# Patient Record
Sex: FEMALE | Race: WHITE | Employment: PART TIME | URBAN - METROPOLITAN AREA
[De-identification: names, ages, dates, MRNs, and addresses within clinical notes are randomized per-mention and may not be internally consistent; named-entity substitution may affect disease eponyms.]

---

## 2017-01-11 ENCOUNTER — HOSPITAL ENCOUNTER (OUTPATIENT)
Dept: LAB | Age: 67
Discharge: HOME | End: 2017-01-11

## 2017-01-11 RX ORDER — ATENOLOL 50 MG/1
TABLET ORAL
Qty: 90 TAB | Refills: 1 | Status: SHIPPED | OUTPATIENT
Start: 2017-01-11 | End: 2017-07-12 | Stop reason: SDUPTHER

## 2017-01-11 RX ORDER — ATORVASTATIN CALCIUM 20 MG/1
TABLET, FILM COATED ORAL
Qty: 90 TAB | Refills: 1 | Status: SHIPPED | OUTPATIENT
Start: 2017-01-11 | End: 2017-05-10 | Stop reason: SDUPTHER

## 2017-02-22 RX ORDER — VENLAFAXINE HYDROCHLORIDE 37.5 MG/1
CAPSULE, EXTENDED RELEASE ORAL
Qty: 90 CAP | Refills: 0 | Status: SHIPPED | OUTPATIENT
Start: 2017-02-22 | End: 2017-04-13 | Stop reason: DRUGHIGH

## 2017-04-06 ENCOUNTER — HOSPITAL ENCOUNTER (OUTPATIENT)
Dept: MAMMOGRAPHY | Age: 67
Discharge: HOME | End: 2017-04-06
Attending: SPECIALIST
Payer: MEDICARE

## 2017-04-06 DIAGNOSIS — Z12.31 VISIT FOR SCREENING MAMMOGRAM: ICD-10-CM

## 2017-04-06 PROCEDURE — 77067 SCR MAMMO BI INCL CAD: CPT

## 2017-04-13 ENCOUNTER — OFFICE VISIT (OUTPATIENT)
Dept: INTERNAL MEDICINE CLINIC | Age: 67
End: 2017-04-13

## 2017-04-13 ENCOUNTER — TELEPHONE (OUTPATIENT)
Dept: INTERNAL MEDICINE CLINIC | Age: 67
End: 2017-04-13

## 2017-04-13 VITALS
SYSTOLIC BLOOD PRESSURE: 128 MMHG | DIASTOLIC BLOOD PRESSURE: 82 MMHG | TEMPERATURE: 98.1 F | HEART RATE: 60 BPM | RESPIRATION RATE: 18 BRPM | BODY MASS INDEX: 25.86 KG/M2 | WEIGHT: 140.5 LBS | OXYGEN SATURATION: 98 % | HEIGHT: 62 IN

## 2017-04-13 DIAGNOSIS — K64.9 BLEEDING HEMORRHOID: Primary | ICD-10-CM

## 2017-04-13 DIAGNOSIS — K59.04 CHRONIC IDIOPATHIC CONSTIPATION: ICD-10-CM

## 2017-04-13 RX ORDER — BUPROPION HYDROCHLORIDE 150 MG/1
TABLET ORAL
Refills: 0 | COMMUNITY
Start: 2017-03-02 | End: 2017-05-10 | Stop reason: SDUPTHER

## 2017-04-13 RX ORDER — CYCLOBENZAPRINE HCL 5 MG
TABLET ORAL
Refills: 0 | COMMUNITY
Start: 2017-03-16 | End: 2017-04-13 | Stop reason: ALTCHOICE

## 2017-04-13 RX ORDER — ESZOPICLONE 1 MG/1
TABLET, FILM COATED ORAL
Refills: 0 | COMMUNITY
Start: 2017-03-04 | End: 2017-04-13 | Stop reason: ALTCHOICE

## 2017-04-13 RX ORDER — ESTRADIOL 10 UG/1
INSERT VAGINAL AS NEEDED
Refills: 0 | COMMUNITY
Start: 2017-02-17 | End: 2018-06-07 | Stop reason: ALTCHOICE

## 2017-04-13 RX ORDER — VENLAFAXINE HYDROCHLORIDE 150 MG/1
CAPSULE, EXTENDED RELEASE ORAL
Refills: 0 | COMMUNITY
Start: 2017-03-02 | End: 2017-04-13 | Stop reason: DRUGHIGH

## 2017-04-13 RX ORDER — METHOCARBAMOL 500 MG/1
TABLET, FILM COATED ORAL
Refills: 0 | COMMUNITY
Start: 2017-03-21 | End: 2017-12-11 | Stop reason: ALTCHOICE

## 2017-04-13 RX ORDER — VENLAFAXINE HYDROCHLORIDE 75 MG/1
CAPSULE, EXTENDED RELEASE ORAL
Refills: 0 | COMMUNITY
Start: 2017-02-22 | End: 2017-05-10 | Stop reason: SDUPTHER

## 2017-04-13 RX ORDER — NAPROXEN 500 MG/1
TABLET ORAL
Refills: 0 | COMMUNITY
Start: 2017-03-16 | End: 2017-04-13 | Stop reason: ALTCHOICE

## 2017-04-13 NOTE — TELEPHONE ENCOUNTER
Call transferred as level 1. Pt complaining of rectal bleeding since approximately 6a. She thought is was an external hemorrhoid but does not seem that is blood is coming from and it continues to bleed since then. Pt advised that blood is bright red and denies any other symptoms. Advised of appt for evaluation. Appt provided.

## 2017-04-13 NOTE — PROGRESS NOTES
Chief Complaint   Patient presents with    Rectal Bleeding     Rectal bleeding  Pt reports she felt pea sized polpyp last few days which got firm but then resolved and became fleshy. She had colonscopy with Dr. Kalyn Torres and no polyps or adenoma. She had colonscopy . No abdominal pain but occasional heartburn and takes pepcid ac  She reports there is about 2 Tablespoons of blood which comes out. She reports there is no gushing blood or increase of blood in commode. No abdominal pain, no weight loss, energy level is good. Past Medical History:   Diagnosis Date    Anxiety     Basal cell carcinoma, eyelid     right, Dr. Rosario Hernandez, Dr. Kam Aldridge Environmental allergies     GERD (gastroesophageal reflux disease)     HTN (hypertension) 2011    Hyperlipidemia     Insomnia     Menopause 2009    MVP (mitral valve prolapse) 2009    rare palpitations.  mild-mod regurg 2011    Normal stress echocardiogram 10/2009    Recurrent cold sores      Past Surgical History:   Procedure Laterality Date    BREAST SURGERY PROCEDURE UNLISTED      breast reduction  Dr Christiano Crane HX BREAST REDUCTION Bilateral     HX GYN           Social History     Social History    Marital status:      Spouse name: N/A    Number of children: N/A    Years of education: N/A     Social History Main Topics    Smoking status: Former Smoker     Quit date: 1990    Smokeless tobacco: Never Used      Comment: quit     Alcohol use 1.8 - 2.4 oz/week     3 - 4 Glasses of wine per week    Drug use: No    Sexual activity: Yes     Partners: Male     Other Topics Concern    None     Social History Narrative     Family History   Problem Relation Age of Onset    Cancer Father      lung    Hypertension Brother      Current Outpatient Prescriptions   Medication Sig Dispense Refill    buPROPion XL (WELLBUTRIN XL) 150 mg tablet take 1 tablet by mouth every morning  0    venlafaxine-SR (EFFEXOR-XR) 75 mg capsule take 1 capsule by mouth every morning  0    VAGIFEM 10 mcg tab vaginal tablet as needed. 0    atorvastatin (LIPITOR) 20 mg tablet take 1 tablet by mouth once daily 90 Tab 1    atenolol (TENORMIN) 50 mg tablet take 1 tablet by mouth once daily 90 Tab 1    topiramate (TOPAMAX) 50 mg tablet take 1 tablet by mouth daily 30 Tab 5    traZODone (DESYREL) 50 mg tablet take 1 tablet once daily 30 Tab 3    metFORMIN ER (GLUCOPHAGE XR) 500 mg tablet take 1 tablet by mouth daily with DINNER 901 Tab 2    venlafaxine-SR (EFFEXOR-XR) 37.5 mg capsule take 1 capsule by mouth once daily 90 Cap 0    ALPRAZolam (XANAX) 1 mg tablet Take 1 mg by mouth as needed. 0    VOLTAREN 1 % topical gel   0    lisinopril (PRINIVIL, ZESTRIL) 40 mg tablet Take 40 mg by mouth daily.  calcium 600 mg Cap take  by mouth daily.  methocarbamol (ROBAXIN) 500 mg tablet   0     Allergies   Allergen Reactions    Codeine Other (comments)     \"unusual sensation\"       Review of Systems - General ROS: negative for - chills, fatigue, fever, hot flashes, malaise, night sweats or sleep disturbance  Cardiovascular ROS: no chest pain or dyspnea on exertion  Respiratory ROS: no cough, shortness of breath, or wheezing    Visit Vitals    /82 (BP 1 Location: Left arm, BP Patient Position: Sitting)    Pulse 60    Temp 98.1 °F (36.7 °C) (Oral)    Resp 18    Ht 5' 2\" (1.575 m)    Wt 140 lb 8 oz (63.7 kg)    SpO2 98%    BMI 25.7 kg/m2     General Appearance:  Well developed, well nourished,alert and oriented x 3, and individual in no acute distress. Ears/Nose/Mouth/Throat:   Hearing grossly normal.         Neck: Supple, no lad, no bruits   Chest:   Lungs clear to auscultation bilaterally. Cardiovascular:  Regular rate and rhythm, S1, S2 normal, no murmur. Abdomen:   Soft, non-tender, bowel sounds are active. Extremities: No edema bilaterally.     Skin: Warm and dry, no suspicious lesions  Rectal: gross heme positive stool,  1 external polyp, +internal polyps                 Montserrat Andres was seen today for rectal bleeding. Diagnoses and all orders for this visit:      Hemorrhoids  Chronic idiopathic constipation  Pt has rectal bleeding likely secondary to internal hemorroids. Discussed with pt her colonscopy was about 4 years ago and no suspicious lesions. It did not note diverticuli. Discussed constipation as likely cause for her sx. She will do clean out with mom or miralax twice a day for 1 week then switch to daily. Goal of stool discussed as soft/peanut butter consistency. She agrees to try clean out and maintenace as notice chronic hx of constipation. Discussed if her bleeding does not improve in the next 1-2 months she may need colonscopy. She agrees  I spent 25 min with this patient and >50% of the time was spent on counseling and management of hemorroids and constipation. This note will not be viewable in [address]

## 2017-04-13 NOTE — MR AVS SNAPSHOT
Visit Information Date & Time Provider Department Dept. Phone Encounter #  
 4/13/2017  1:00 PM Kathleen Calderon MD [facility] [address]  [id number] Upcoming Health Maintenance Date Due Hepatitis C Screening 1950 DTaP/Tdap/Td series (1 - Tdap) 7/26/2007 ZOSTER VACCINE AGE 60> 7/18/2010 GLAUCOMA SCREENING Q2Y 7/18/2015 OSTEOPOROSIS SCREENING (DEXA) 7/18/2015 Pneumococcal 65+ Low/Medium Risk (1 of 2 - PCV13) 7/18/2015 MEDICARE YEARLY EXAM 7/18/2015 INFLUENZA AGE 9 TO ADULT 8/1/2016 BREAST CANCER SCRN MAMMOGRAM 4/6/2019 COLONOSCOPY 2/18/2024 Allergies as of 4/13/2017  Review Complete On: 4/13/2017 By: Kathleen Calderon MD  
  
 Severity Noted Reaction Type Reactions Codeine  04/21/2009    Other (comments) \"unusual sensation\" Current Immunizations  Reviewed on 6/4/2012 Name Date Rabies Vaccine 6/4/2012  2:38 PM, 5/28/2012 11:51 AM, 5/24/2012  4:24 PM, 5/21/2012  5:41 PM  
 TD Vaccine 7/25/2007 Not reviewed this visit You Were Diagnosed With   
  
 Codes Comments Chronic idiopathic constipation    -  Primary ICD-10-CM: K59.04 
ICD-9-CM: 564.00 Vitals BP Pulse Temp Resp Height(growth percentile) Weight(growth percentile) 128/82 (BP 1 Location: Left arm, BP Patient Position: Sitting) 60 98.1 °F (36.7 °C) (Oral) 18 5' 2\" (1.575 m) 140 lb 8 oz (63.7 kg) SpO2 BMI OB Status Smoking Status 98% 25.7 kg/m2 Postmenopausal Former Smoker Vitals History BMI and BSA Data Body Mass Index Body Surface Area 25.7 kg/m 2 1.67 m 2 Preferred Pharmacy Pharmacy Name Phone [facility] [address] [place] [address] [address] Your Updated Medication List  
  
   
This list is accurate as of: 4/13/17  1:47 PM.  Always use your most recent med list.  
  
  
  
  
 ALPRAZolam 1 mg tablet Commonly known as:  June Jessica Take 1 mg by mouth as needed. atenolol 50 mg tablet Commonly known as:  TENORMIN  
take 1 tablet by mouth once daily  
  
 atorvastatin 20 mg tablet Commonly known as:  LIPITOR  
take 1 tablet by mouth once daily buPROPion  mg tablet Commonly known as:  WELLBUTRIN XL  
take 1 tablet by mouth every morning  
  
 calcium 600 mg Cap  
take  by mouth daily. lisinopril 40 mg tablet Commonly known as:  Ion Leaver Take 40 mg by mouth daily. metFORMIN  mg tablet Commonly known as:  GLUCOPHAGE XR  
take 1 tablet by mouth daily with DINNER  
  
 methocarbamol 500 mg tablet Commonly known as:  ROBAXIN  
  
 topiramate 50 mg tablet Commonly known as:  TOPAMAX  
take 1 tablet by mouth daily  
  
 traZODone 50 mg tablet Commonly known as:  DESYREL  
take 1 tablet once daily VAGIFEM 10 mcg Tab vaginal tablet Generic drug:  estradiol  
as needed. * venlafaxine-SR 37.5 mg capsule Commonly known as:  EFFEXOR-XR  
take 1 capsule by mouth once daily * venlafaxine-SR 75 mg capsule Commonly known as:  EFFEXOR-XR  
take 1 capsule by mouth every morning VOLTAREN 1 % Gel Generic drug:  diclofenac * Notice: This list has 2 medication(s) that are the same as other medications prescribed for you. Read the directions carefully, and ask your doctor or other care provider to review them with you. Patient Instructions Hemorrhoids: Care Instructions Your Care Instructions Hemorrhoids are enlarged veins that develop in the anal canal. Bleeding during bowel movements, itching, swelling, and rectal pain are the most common symptoms. They can be uncomfortable at times, but hemorrhoids rarely are a serious problem. You can treat most hemorrhoids with simple changes to your diet and bowel habits. These changes include eating more fiber and not straining to pass stools.  Most hemorrhoids do not need surgery or other treatment unless they are very large and painful or bleed a lot. Follow-up care is a key part of your treatment and safety. Be sure to make and go to all appointments, and call your doctor if you are having problems. Its also a good idea to know your test results and keep a list of the medicines you take. How can you care for yourself at home? · Sit in a few inches of warm water (sitz bath) 3 times a day and after bowel movements. The warm water helps with pain and itching. · Put ice on your anal area several times a day for 10 minutes at a time. Put a thin cloth between the ice and your skin. Follow this by placing a warm, wet towel on the area for another 10 to 20 minutes. · Take pain medicines exactly as directed. ¨ If the doctor gave you a prescription medicine for pain, take it as prescribed. ¨ If you are not taking a prescription pain medicine, ask your doctor if you can take an over-the-counter medicine. · Keep the anal area clean, but be gentle. Use water and a fragrance-free soap, such as [place], or use baby wipes or medicated pads, such as Tucks. · Wear cotton underwear and loose clothing to decrease moisture in the anal area. · Eat more fiber. Include foods such as whole-grain breads and cereals, raw vegetables, raw and dried fruits, and beans. · Drink plenty of fluids, enough so that your urine is light yellow or clear like water. If you have kidney, heart, or liver disease and have to limit fluids, talk with your doctor before you increase the amount of fluids you drink. · Use a stool softener that contains bran or psyllium. You can save money by buying bran or psyllium (available in bulk at most health food stores) and sprinkling it on foods or stirring it into fruit juice. Or you can use a product such as Metamucil or Hydrocil. · Practice healthy bowel habits. ¨ Go to the bathroom as soon as you have the urge. ¨ Avoid straining to pass stools.  Relax and give yourself time to let things happen naturally. ¨ Do not hold your breath while passing stools. ¨ Do not read while sitting on the toilet. Get off the toilet as soon as you have finished. · Take your medicines exactly as prescribed. Call your doctor if you think you are having a problem with your medicine. When should you call for help? Call 911 anytime you think you may need emergency care. For example, call if: 
· You pass maroon or very bloody stools. Call your doctor now or seek immediate medical care if: 
· You have increased pain. · You have increased bleeding. Watch closely for changes in your health, and be sure to contact your doctor if: 
· Your symptoms have not improved after 3 or 4 days. Where can you learn more? Go to http://peri-akhil.info/. Enter F228 in the search box to learn more about \"Hemorrhoids: Care Instructions. \" Current as of: August 9, 2016 Content Version: 11.2 © 3906-4732 [employer]. Care instructions adapted under license by [employer] (which disclaims liability or warranty for this information). If you have questions about a medical condition or this instruction, always ask your healthcare professional. [address] any warranty or liability for your use of this information. Introducing [facility]! Dear Libby Peñaloza: Thank you for requesting a [employer] account. Our records indicate that you already have an active [employer] account. You can access your account anytime at [email or web address] Did you know that you can access your hospital and ER discharge instructions at any time in [employer]? You can also review all of your test results from your hospital stay or ER visit. Additional Information If you have questions, please visit the Frequently Asked Questions section of the [employer] website at [email or web address] Remember, MyChart is NOT to be used for urgent needs. For medical emergencies, dial 911. Now available from your iPhone and Android! Please provide this summary of care documentation to your next provider. Your primary care clinician is listed as Fernanda Prasad. If you have any questions after today's visit, please call [phone].

## 2017-04-13 NOTE — PATIENT INSTRUCTIONS

## 2017-04-25 ENCOUNTER — TELEPHONE (OUTPATIENT)
Dept: INTERNAL MEDICINE CLINIC | Age: 67
End: 2017-04-25

## 2017-04-25 NOTE — TELEPHONE ENCOUNTER
V/m left for patient notifying per PCP she is over due for her 6 month f/u & med evaluation. I expressed she did see Roberta Teixeira recently for an acute issue but she is still overdue for a med evaluation with PCP. Encouraged patient to return the office call to schedule an appt.

## 2017-04-25 NOTE — TELEPHONE ENCOUNTER
Patient request a return call regarding concerns with why her medication was denied.  Patient contact [phone] (N)

## 2017-04-27 NOTE — TELEPHONE ENCOUNTER
Patient refill. Patient has scheduled an appt for 5/10/17. She would like enough medication to get her through until her appt.

## 2017-04-28 RX ORDER — LISINOPRIL 40 MG/1
40 TABLET ORAL DAILY
Qty: 20 TAB | Refills: 0 | Status: SHIPPED | OUTPATIENT
Start: 2017-04-28 | End: 2017-05-10 | Stop reason: SDUPTHER

## 2017-05-10 ENCOUNTER — OFFICE VISIT (OUTPATIENT)
Dept: INTERNAL MEDICINE CLINIC | Age: 67
End: 2017-05-10

## 2017-05-10 VITALS
TEMPERATURE: 97.7 F | HEART RATE: 87 BPM | SYSTOLIC BLOOD PRESSURE: 115 MMHG | WEIGHT: 138.2 LBS | DIASTOLIC BLOOD PRESSURE: 77 MMHG | RESPIRATION RATE: 17 BRPM | BODY MASS INDEX: 25.43 KG/M2 | HEIGHT: 62 IN | OXYGEN SATURATION: 98 %

## 2017-05-10 DIAGNOSIS — F41.9 ANXIETY: ICD-10-CM

## 2017-05-10 DIAGNOSIS — E78.5 DYSLIPIDEMIA, GOAL LDL BELOW 100: ICD-10-CM

## 2017-05-10 DIAGNOSIS — I10 ESSENTIAL HYPERTENSION: Primary | ICD-10-CM

## 2017-05-10 DIAGNOSIS — R73.02 GLUCOSE INTOLERANCE (IMPAIRED GLUCOSE TOLERANCE): ICD-10-CM

## 2017-05-10 DIAGNOSIS — Z11.59 SCREENING FOR VIRAL DISEASE: ICD-10-CM

## 2017-05-10 PROBLEM — F98.8 ADD (ATTENTION DEFICIT DISORDER): Status: ACTIVE | Noted: 2017-05-10

## 2017-05-10 RX ORDER — VENLAFAXINE HYDROCHLORIDE 75 MG/1
CAPSULE, EXTENDED RELEASE ORAL
Qty: 30 CAP | Refills: 5 | Status: SHIPPED | OUTPATIENT
Start: 2017-05-10 | End: 2017-11-20 | Stop reason: SDUPTHER

## 2017-05-10 RX ORDER — ATORVASTATIN CALCIUM 20 MG/1
TABLET, FILM COATED ORAL
Qty: 90 TAB | Refills: 1 | Status: SHIPPED | OUTPATIENT
Start: 2017-05-10 | End: 2017-12-11 | Stop reason: SDUPTHER

## 2017-05-10 RX ORDER — BUPROPION HYDROCHLORIDE 150 MG/1
TABLET ORAL
Qty: 30 TAB | Refills: 5 | Status: SHIPPED | OUTPATIENT
Start: 2017-05-10 | End: 2017-11-14 | Stop reason: SDUPTHER

## 2017-05-10 RX ORDER — LISINOPRIL 40 MG/1
40 TABLET ORAL DAILY
Qty: 90 TAB | Refills: 2 | Status: SHIPPED | OUTPATIENT
Start: 2017-05-10 | End: 2017-12-11 | Stop reason: SDUPTHER

## 2017-05-10 RX ORDER — DEXTROAMPHETAMINE SACCHARATE, AMPHETAMINE ASPARTATE, DEXTROAMPHETAMINE SULFATE AND AMPHETAMINE SULFATE 2.5; 2.5; 2.5; 2.5 MG/1; MG/1; MG/1; MG/1
10 TABLET ORAL
COMMUNITY
End: 2018-06-07 | Stop reason: ALTCHOICE

## 2017-05-10 NOTE — PROGRESS NOTES
HISTORY OF PRESENT ILLNESS  Irving Alvarado is a 77 y.o. female. HPI  Med check. Issues:  1. Hypertension, on Lisinopril. No significant cough, chest pain or shortness of breath. 2. Dyslipidemia, on statin. She wants to discuss pros and cons. We discussed benefits in terms of decreasing stroke and heart attack and risks in terms of myopathy. Will check a CK level today. 3. Previous bleeding with constipation. This is all resolved. We discussed using Miralax daily to help prevent. 4. ADD. Did see a nurse practitioner named Pily Hatfield, who changed her meds to Effexor 75, Wellbutrin 150 and gave her Adderall 10 mg for prn use. She feels this combination has been very helpful. I'm happy to take over on Wellbutrin and Effexor, but asked her to continue with her mental health practitioner for the Adderall. She's in agreement. Review of Systems   Constitutional: Negative for chills, fever and weight loss. Respiratory: Negative for cough, shortness of breath and wheezing. Cardiovascular: Negative for chest pain, palpitations, orthopnea, leg swelling and PND. Gastrointestinal: Positive for constipation. Negative for abdominal pain, blood in stool, heartburn and nausea. Musculoskeletal: Negative for myalgias. Neurological: Negative for dizziness and headaches. Psychiatric/Behavioral: Negative for depression. The patient is not nervous/anxious and does not have insomnia. Physical Exam   Constitutional: She is oriented to person, place, and time. She appears well-developed and well-nourished. HENT:   Head: Normocephalic and atraumatic. Neck: Normal range of motion. Neck supple. Carotid bruit is not present. No thyromegaly present. Cardiovascular: Normal rate, regular rhythm, S1 normal, S2 normal, normal heart sounds and intact distal pulses. No murmur heard. Pulmonary/Chest: Effort normal and breath sounds normal. No respiratory distress. She has no wheezes. She has no rales. Musculoskeletal: She exhibits no edema. Neurological: She is alert and oriented to person, place, and time. Psychiatric: She has a normal mood and affect. Her behavior is normal.   Nursing note and vitals reviewed. ASSESSMENT and PLAN  Eda Noonan was seen today for medication evaluation. Diagnoses and all orders for this visit:    Essential hypertension  -     lisinopril (PRINIVIL, ZESTRIL) 40 mg tablet; Take 1 Tab by mouth daily.     Dyslipidemia, goal LDL below 100  -     atorvastatin (LIPITOR) 20 mg tablet; take 1 tablet by mouth once daily  -     METABOLIC PANEL, COMPREHENSIVE  -     LIPID PANEL  -     TSH 3RD GENERATION  -     CK    Anxiety-cont meds stable overall see mental health provider for the add meds  -     buPROPion XL (WELLBUTRIN XL) 150 mg tablet; take 1 tablet by mouth every morning  -     venlafaxine-SR (EFFEXOR-XR) 75 mg capsule; take 1 capsule by mouth every morning    Screening for viral disease  -     HEPATITIS C AB    Glucose intolerance (impaired glucose tolerance)  -     HEMOGLOBIN A1C WITH EAG      lab results and schedule of future lab studies reviewed with patient  reviewed diet, exercise and weight control

## 2017-05-10 NOTE — MR AVS SNAPSHOT
Visit Information Date & Time Provider Department Dept. Phone Encounter #  
 5/10/2017  3:00 PM Cem Lam MD [facility] [address] [id number] Upcoming Health Maintenance Date Due Hepatitis C Screening 1950 DTaP/Tdap/Td series (1 - Tdap) 7/26/2007 ZOSTER VACCINE AGE 60> 7/18/2010 GLAUCOMA SCREENING Q2Y 7/18/2015 OSTEOPOROSIS SCREENING (DEXA) 7/18/2015 Pneumococcal 65+ Low/Medium Risk (1 of 2 - PCV13) 7/18/2015 MEDICARE YEARLY EXAM 7/18/2015 INFLUENZA AGE 9 TO ADULT 8/1/2017 BREAST CANCER SCRN MAMMOGRAM 4/6/2019 COLONOSCOPY 2/18/2024 Allergies as of 5/10/2017  Review Complete On: 5/10/2017 By: Cem Lam MD  
  
 Severity Noted Reaction Type Reactions Codeine  04/21/2009    Other (comments) \"unusual sensation\" Current Immunizations  Reviewed on 6/4/2012 Name Date Rabies Vaccine 6/4/2012  2:38 PM, 5/28/2012 11:51 AM, 5/24/2012  4:24 PM, 5/21/2012  5:41 PM  
 TD Vaccine 7/25/2007 Not reviewed this visit You Were Diagnosed With   
  
 Codes Comments Essential hypertension    -  Primary ICD-10-CM: I10 
ICD-9-CM: 401.9 Dyslipidemia, goal LDL below 100     ICD-10-CM: E78.5 ICD-9-CM: 272.4 Anxiety     ICD-10-CM: F41.9 ICD-9-CM: 300.00 Screening for viral disease     ICD-10-CM: Z11.59 
ICD-9-CM: V73.99 Glucose intolerance (impaired glucose tolerance)     ICD-10-CM: R73.02 
ICD-9-CM: 790.22 Vitals BP Pulse Temp Resp Height(growth percentile) Weight(growth percentile) 115/77 (BP 1 Location: Left arm, BP Patient Position: Sitting) 87 97.7 °F (36.5 °C) (Oral) 17 5' 2\" (1.575 m) 138 lb 3.2 oz (62.7 kg) SpO2 BMI OB Status Smoking Status 98% 25.28 kg/m2 Postmenopausal Former Smoker Vitals History BMI and BSA Data Body Mass Index Body Surface Area  
 25.28 kg/m 2 1.66 m 2 Preferred Pharmacy Pharmacy Name Phone [facility], [address] [facility] Your Updated Medication List  
  
   
This list is accurate as of: 5/10/17  3:29 PM.  Always use your most recent med list.  
  
  
  
  
 ADDERALL 10 mg tablet Generic drug:  dextroamphetamine-amphetamine Take 10 mg by mouth. ALPRAZolam 1 mg tablet Commonly known as:  Derek Cellar Take 1 mg by mouth as needed. atenolol 50 mg tablet Commonly known as:  TENORMIN  
take 1 tablet by mouth once daily  
  
 atorvastatin 20 mg tablet Commonly known as:  LIPITOR  
take 1 tablet by mouth once daily buPROPion  mg tablet Commonly known as:  WELLBUTRIN XL  
take 1 tablet by mouth every morning  
  
 calcium 600 mg Cap  
take  by mouth daily. lisinopril 40 mg tablet Commonly known as:  Minus Salk Take 1 Tab by mouth daily. metFORMIN  mg tablet Commonly known as:  GLUCOPHAGE XR  
take 1 tablet by mouth daily with DINNER  
  
 methocarbamol 500 mg tablet Commonly known as:  ROBAXIN  
  
 topiramate 50 mg tablet Commonly known as:  TOPAMAX  
take 1 tablet by mouth daily  
  
 traZODone 50 mg tablet Commonly known as:  DESYREL  
take 1 tablet once daily VAGIFEM 10 mcg Tab vaginal tablet Generic drug:  estradiol  
as needed. venlafaxine-SR 75 mg capsule Commonly known as:  EFFEXOR-XR  
take 1 capsule by mouth every morning VOLTAREN 1 % Gel Generic drug:  diclofenac Prescriptions Sent to Pharmacy Refills buPROPion XL (WELLBUTRIN XL) 150 mg tablet 5 Sig: take 1 tablet by mouth every morning Class: Normal  
 [facility]acy: [facility] [address] [place] [address], [address] [place] [address] Ph #: [phone]  
 venlafaxine-SR (EFFEXOR-XR) 75 mg capsule 5 Sig: take 1 capsule by mouth every morning Class: Normal  
 Pharmacy: [address] [place] [address], [address] [place] [address] Ph #: [phone] lisinopril (PRINIVIL, ZESTRIL) 40 mg tablet 2 Sig: Take 1 Tab by mouth daily. Class: Normal  
 Pharmacy: [address] [place] [address], [address] [place] [address] ROAD Ph #: [phone] Route: Oral  
 atorvastatin (LIPITOR) 20 mg tablet 1 Sig: take 1 tablet by mouth once daily Class: Normal  
 Pharmacy: [address] [place] [address], [address] [place] [address] RO[address] Ph #: [phone] We Performed the Following CK [id number] CPT(R)] HEMOGLOBIN A1C WITH EAG [70670 CPT(R)] HEPATITIS C AB [05772 CPT(R)] LIPID PANEL [50242 CPT(R)] METABOLIC PANEL, COMPREHENSIVE [19141 CPT(R)] TSH 3RD GENERATION [91956 CPT(R)] Introducing \[facility]\"" & [facility]! Dear Terrell Olguin: Thank you for requesting a [employer] account. Our records indicate that you already have an active [employer] account. You can access your account anytime at [email or web address] Did you know that you can access your hospital and ER discharge instructions at any time in [employer]? You can also review all of your test results from your hospital stay or ER visit. Additional Information If you have questions, please visit the Frequently Asked Questions section of the [employer] website at [email or web address] [email or web address]/. Remember, [employer] is NOT to be used for urgent needs. For medical emergencies, dial 911. Now available from your iPhone and Android! Please provide this summary of care documentation to your next provider. Your primary care clinician is listed as Fernanda 68. If you have any questions after today's visit, please call [phone].

## 2017-06-07 ENCOUNTER — TELEPHONE (OUTPATIENT)
Dept: INTERNAL MEDICINE CLINIC | Age: 67
End: 2017-06-07

## 2017-06-07 RX ORDER — TOPIRAMATE 50 MG/1
TABLET, FILM COATED ORAL
Qty: 30 TAB | Refills: 5 | Status: SHIPPED | OUTPATIENT
Start: 2017-06-07 | End: 2018-01-22 | Stop reason: SDUPTHER

## 2017-06-07 NOTE — TELEPHONE ENCOUNTER
----- Message from Mikal Tsang sent at 6/7/2017 12:08 PM EDT -----  Regarding: [place]/refill  Pt [facility] on [facility] called [facility] 2 days ago. She is going out of town shortly. Best contact number [phone].

## 2017-06-17 DIAGNOSIS — G47.00 INSOMNIA: ICD-10-CM

## 2017-06-18 RX ORDER — TRAZODONE HYDROCHLORIDE 50 MG/1
TABLET ORAL
Qty: 30 TAB | Refills: 3 | Status: SHIPPED | OUTPATIENT
Start: 2017-06-18 | End: 2017-11-19 | Stop reason: SDUPTHER

## 2017-06-26 RX ORDER — BUPROPION HYDROCHLORIDE 300 MG/1
TABLET ORAL
Qty: 90 TAB | Refills: 1 | Status: SHIPPED | OUTPATIENT
Start: 2017-06-26 | End: 2017-12-11 | Stop reason: ALTCHOICE

## 2017-06-27 ENCOUNTER — HOSPITAL ENCOUNTER (OUTPATIENT)
Dept: LAB | Age: 67
Discharge: HOME | End: 2017-06-27
Payer: MEDICARE

## 2017-06-27 PROCEDURE — 36415 COLL VENOUS BLD VENIPUNCTURE: CPT

## 2017-06-27 PROCEDURE — 83036 HEMOGLOBIN GLYCOSYLATED A1C: CPT

## 2017-06-27 PROCEDURE — 82550 ASSAY OF CK (CPK): CPT

## 2017-06-27 PROCEDURE — 80053 COMPREHEN METABOLIC PANEL: CPT

## 2017-06-27 PROCEDURE — 80061 LIPID PANEL: CPT

## 2017-06-27 PROCEDURE — 86803 HEPATITIS C AB TEST: CPT

## 2017-06-27 PROCEDURE — 84443 ASSAY THYROID STIM HORMONE: CPT

## 2017-06-28 LAB
ALBUMIN SERPL-MCNC: 4.3 G/DL (ref 3.6–4.8)
ALBUMIN/GLOB SERPL: 1.9 {RATIO} (ref 1.2–2.2)
ALP SERPL-CCNC: 89 IU/L (ref 39–117)
ALT SERPL-CCNC: 19 IU/L (ref 0–32)
AST SERPL-CCNC: 18 IU/L (ref 0–40)
BILIRUB SERPL-MCNC: 0.4 MG/DL (ref 0–1.2)
BUN SERPL-MCNC: 14 MG/DL (ref 8–27)
BUN/CREAT SERPL: 15 (ref 12–28)
CALCIUM SERPL-MCNC: 9.9 MG/DL (ref 8.7–10.3)
CHLORIDE SERPL-SCNC: 105 MMOL/L (ref 96–106)
CHOLEST SERPL-MCNC: 146 MG/DL (ref 100–199)
CK SERPL-CCNC: 53 U/L (ref 24–173)
CO2 SERPL-SCNC: 22 MMOL/L (ref 18–29)
CREAT SERPL-MCNC: 0.93 MG/DL (ref 0.57–1)
EST. AVERAGE GLUCOSE BLD GHB EST-MCNC: 111 MG/DL
GLOBULIN SER CALC-MCNC: 2.3 G/DL (ref 1.5–4.5)
GLUCOSE SERPL-MCNC: 114 MG/DL (ref 65–99)
HBA1C MFR BLD: 5.5 % (ref 4.8–5.6)
HCV AB S/CO SERPL IA: <0.1 S/CO RATIO (ref 0–0.9)
HDLC SERPL-MCNC: 53 MG/DL
INTERPRETATION, 910389: NORMAL
LDLC SERPL CALC-MCNC: 45 MG/DL (ref 0–99)
POTASSIUM SERPL-SCNC: 4.7 MMOL/L (ref 3.5–5.2)
PROT SERPL-MCNC: 6.6 G/DL (ref 6–8.5)
SODIUM SERPL-SCNC: 144 MMOL/L (ref 134–144)
TRIGL SERPL-MCNC: 242 MG/DL (ref 0–149)
TSH SERPL DL<=0.005 MIU/L-ACNC: 3 UIU/ML (ref 0.45–4.5)
VLDLC SERPL CALC-MCNC: 48 MG/DL (ref 5–40)

## 2017-07-12 RX ORDER — ATENOLOL 50 MG/1
TABLET ORAL
Qty: 90 TAB | Refills: 3 | Status: SHIPPED | OUTPATIENT
Start: 2017-07-12 | End: 2018-07-19 | Stop reason: SDUPTHER

## 2017-08-18 ENCOUNTER — PATIENT MESSAGE (OUTPATIENT)
Dept: INTERNAL MEDICINE CLINIC | Age: 67
End: 2017-08-18

## 2017-08-22 ENCOUNTER — PATIENT MESSAGE (OUTPATIENT)
Dept: INTERNAL MEDICINE CLINIC | Age: 67
End: 2017-08-22

## 2017-08-23 NOTE — TELEPHONE ENCOUNTER
From: Kaylee Trujillo  To: Ponce Delacruz MD  Sent: 8/18/2017 1:37 PM EDT  Subject: Referral Request      Hi Dr Geena Go would like to see a Rheumatologist and Mary Jones in [place] was suggested by a friend. His office requires records from my primary Doc. Their # is [phone] and fax is [phone], Thanks!   Kenyatta Yanez  [phone]

## 2017-08-23 NOTE — TELEPHONE ENCOUNTER
From: Eli Trujillo  To: Zach Walsh MD  Sent: 8/22/2017 5:56 PM EDT  Subject: Referral Request      Just checking to make sure you had received the referral request I sent on Aug. 18th. ..

## 2017-10-09 DIAGNOSIS — R73.02 GLUCOSE INTOLERANCE (IMPAIRED GLUCOSE TOLERANCE): ICD-10-CM

## 2017-10-09 RX ORDER — METFORMIN HYDROCHLORIDE 500 MG/1
TABLET, EXTENDED RELEASE ORAL
Qty: 901 TAB | Refills: 2 | Status: SHIPPED | OUTPATIENT
Start: 2017-10-09

## 2017-11-14 DIAGNOSIS — F41.9 ANXIETY: ICD-10-CM

## 2017-11-14 RX ORDER — BUPROPION HYDROCHLORIDE 150 MG/1
TABLET ORAL
Qty: 30 TAB | Refills: 0 | Status: SHIPPED | OUTPATIENT
Start: 2017-11-14 | End: 2017-12-13 | Stop reason: SDUPTHER

## 2017-11-19 DIAGNOSIS — G47.00 INSOMNIA: ICD-10-CM

## 2017-11-19 RX ORDER — TRAZODONE HYDROCHLORIDE 50 MG/1
TABLET ORAL
Qty: 30 TAB | Refills: 0 | Status: SHIPPED | OUTPATIENT
Start: 2017-11-19 | End: 2017-12-21 | Stop reason: SDUPTHER

## 2017-11-20 DIAGNOSIS — F41.9 ANXIETY: ICD-10-CM

## 2017-11-20 RX ORDER — VENLAFAXINE HYDROCHLORIDE 75 MG/1
CAPSULE, EXTENDED RELEASE ORAL
Qty: 30 CAP | Refills: 0 | Status: SHIPPED | OUTPATIENT
Start: 2017-11-20 | End: 2018-01-03 | Stop reason: SDUPTHER

## 2017-12-11 ENCOUNTER — OFFICE VISIT (OUTPATIENT)
Dept: INTERNAL MEDICINE CLINIC | Age: 67
End: 2017-12-11

## 2017-12-11 VITALS
BODY MASS INDEX: 25.76 KG/M2 | WEIGHT: 140 LBS | HEART RATE: 62 BPM | RESPIRATION RATE: 16 BRPM | DIASTOLIC BLOOD PRESSURE: 79 MMHG | SYSTOLIC BLOOD PRESSURE: 126 MMHG | HEIGHT: 62 IN | TEMPERATURE: 97.6 F | OXYGEN SATURATION: 98 %

## 2017-12-11 DIAGNOSIS — I10 ESSENTIAL HYPERTENSION: Primary | ICD-10-CM

## 2017-12-11 DIAGNOSIS — Z12.11 COLON CANCER SCREENING: ICD-10-CM

## 2017-12-11 DIAGNOSIS — F41.9 ANXIETY: ICD-10-CM

## 2017-12-11 DIAGNOSIS — K06.9 DISEASE OF GINGIVA DUE TO RECURRENT ORAL HERPES SIMPLEX VIRUS (HSV) INFECTION: ICD-10-CM

## 2017-12-11 DIAGNOSIS — E78.5 DYSLIPIDEMIA, GOAL LDL BELOW 100: ICD-10-CM

## 2017-12-11 DIAGNOSIS — B00.9 DISEASE OF GINGIVA DUE TO RECURRENT ORAL HERPES SIMPLEX VIRUS (HSV) INFECTION: ICD-10-CM

## 2017-12-11 DIAGNOSIS — Z78.0 POSTMENOPAUSE: ICD-10-CM

## 2017-12-11 DIAGNOSIS — E78.00 PURE HYPERCHOLESTEROLEMIA: ICD-10-CM

## 2017-12-11 RX ORDER — DICLOFENAC POTASSIUM 50 MG/1
1 TABLET, FILM COATED ORAL
Refills: 3 | COMMUNITY
Start: 2017-10-03 | End: 2018-06-07 | Stop reason: ALTCHOICE

## 2017-12-11 RX ORDER — ACYCLOVIR 50 MG/G
OINTMENT TOPICAL
Qty: 2 G | Refills: 0 | Status: SHIPPED | OUTPATIENT
Start: 2017-12-11

## 2017-12-11 RX ORDER — LISINOPRIL 40 MG/1
40 TABLET ORAL DAILY
Qty: 90 TAB | Refills: 2 | Status: SHIPPED | OUTPATIENT
Start: 2017-12-11 | End: 2018-04-17 | Stop reason: DRUGHIGH

## 2017-12-11 RX ORDER — ATORVASTATIN CALCIUM 20 MG/1
TABLET, FILM COATED ORAL
Qty: 90 TAB | Refills: 1 | Status: SHIPPED | OUTPATIENT
Start: 2017-12-11

## 2017-12-11 RX ORDER — ZOLPIDEM TARTRATE 10 MG/1
1 TABLET ORAL
Refills: 0 | COMMUNITY
Start: 2017-11-15

## 2017-12-11 NOTE — MR AVS SNAPSHOT
Visit Information Date & Time Provider Department Dept. Phone Encounter #  
 12/11/2017  2:15 PM Rafy Oquendo MD [facility] [address] [id number] Upcoming Health Maintenance Date Due ZOSTER VACCINE AGE 60> 5/18/2010 GLAUCOMA SCREENING Q2Y 7/18/2015 OSTEOPOROSIS SCREENING (DEXA) 7/18/2015 Pneumococcal 65+ Low/Medium Risk (1 of 2 - PCV13) 7/18/2015 MEDICARE YEARLY EXAM 7/18/2015 Influenza Age 5 to Adult 8/1/2017 BREAST CANCER SCRN MAMMOGRAM 4/6/2019 COLONOSCOPY 2/18/2024 DTaP/Tdap/Td series (2 - Td) 12/11/2027 Allergies as of 12/11/2017  Review Complete On: 12/11/2017 By: Rafy Oquendo MD  
  
 Severity Noted Reaction Type Reactions Codeine  04/21/2009    Other (comments) \"unusual sensation\" Current Immunizations  Reviewed on 6/4/2012 Name Date Rabies Vaccine 6/4/2012  2:38 PM, 5/28/2012 11:51 AM, 5/24/2012  4:24 PM, 5/21/2012  5:41 PM  
 TD Vaccine 7/25/2007 Not reviewed this visit You Were Diagnosed With   
  
 Codes Comments Essential hypertension    -  Primary ICD-10-CM: I10 
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  
 25.61 kg/m 2 1.67 m 2 Preferred Pharmacy Pharmacy Name Phone [facility] [phone] Your Updated Medication List  
  
   
This list is accurate as of: 12/11/17  3:02 PM.  Always use your most recent med list.  
  
  
  
  
 acyclovir 5 % ointment Commonly known as:  ZOVIRAX Apply  to affected area every three (3) hours. ADDERALL 10 mg tablet Generic drug:  dextroamphetamine-amphetamine Take 10 mg by mouth. ALPRAZolam 1 mg tablet Commonly known as:  Harland Marcus Take 1 mg by mouth as needed. atenolol 50 mg tablet Commonly known as:  TENORMIN  
take 1 tablet by mouth once daily  
  
 atorvastatin 20 mg tablet Commonly known as:  LIPITOR  
take 1 tablet by mouth once daily buPROPion  mg tablet Commonly known as:  WELLBUTRIN XL  
TAKE 1 TABLET BY MOUTH EVERY MORNING  
  
 calcium 600 mg Cap  
take  by mouth daily. diclofenac potassium 50 mg tablet Commonly known as:  CATAFLAM  
Take 1 Tab by mouth two (2) times daily as needed. lisinopril 40 mg tablet Commonly known as:  Keyona Pinch Take 1 Tab by mouth daily. metFORMIN  mg tablet Commonly known as:  GLUCOPHAGE XR  
take 1 tablet by mouth daily with DINNER  
  
 topiramate 50 mg tablet Commonly known as:  TOPAMAX  
take 1 tablet by mouth daily  
  
 traZODone 50 mg tablet Commonly known as:  DESYREL  
TAKE 1 TABLET BY MOUTH DAILY  
  
 VAGIFEM 10 mcg Tab vaginal tablet Generic drug:  estradiol  
as needed. venlafaxine-SR 75 mg capsule Commonly known as:  EFFEXOR-XR  
TAKE 1 CAPSULE BY MOUTH EVERY MORNING. VOLTAREN 1 % Gel Generic drug:  diclofenac  
  
 zolpidem 10 mg tablet Commonly known as:  AMBIEN Take 1 Tab by mouth nightly as needed. Prescriptions Sent to Pharmacy Refills acyclovir (ZOVIRAX) 5 % ointment 0 Sig: Apply  to affected area every three (3) hours. Class: Normal  
 Pharmacy: [facility] Ph #: [phone] Route: Topical  
 lisinopril (PRINIVIL, ZESTRIL) 40 mg tablet 2 Sig: Take 1 Tab by mouth daily. Class: Normal  
 Pharmacy: [facility] Ph #: [phone] Route: Oral  
 atorvastatin (LIPITOR) 20 mg tablet 1 Sig: take 1 tablet by mouth once daily Class: Normal  
 Pharmacy: [facility] Ph #: [phone] We Performed the Following CBC WITH AUTOMATED DIFF [19322 CPT(R)] LIPID PANEL [03050 CPT(R)] METABOLIC PANEL, COMPREHENSIVE [75648 CPT(R)] OCCULT BLOOD, IMMUNOASSAY (FIT) [id number] CPT(R)] TSH 3RD GENERATION [15429 CPT(R)] To-Do List   
 12/11/2017 Imaging:  DEXA BONE DENSITY STUDY AXIAL Referral Information Referral ID Referred By Referred To  
  
 [id number] Zarina GUTIÉRREZ Not Available Visits Status Start Date End Date 1 New Request 12/11/17 12/11/18 If your referral has a status of pending review or denied, additional information will be sent to support the outcome of this decision. Introducing [facility]! Dear Anna Mckenzie: Thank you for requesting a Novo[employer] account. Our records indicate that you already have an active NovoED account. You can access your account anytime at [email or web address] Did you know that you can access your hospital and ER discharge instructions at any time in NovoED? You can also review all of your test results from your hospital stay or ER visit. Additional Information If you have questions, please visit the Frequently Asked Questions section of the [employer] website at [email or web address] Remember, [employer] is NOT to be used for urgent needs. For medical emergencies, dial 911. Now available from your iPhone and Android! Please provide this summary of care documentation to your next provider. Your primary care clinician is listed as Fernanda 68. If you have any questions after today's visit, please call [phone].

## 2017-12-11 NOTE — PROGRESS NOTES
HISTORY OF PRESENT ILLNESS  Deborah Blank is a 79 y.o. female. ALFONZO Lindsey is on Lisinopril for blood pressure. No palpitations, chest pain or shortness of breath. She's due for labs for follow up on her dyslipidemia. She is seeing mental health provider for her mood meds and takes Adderall rarely, perhaps a couple times a month. She is still on Effexor and Wellbutrin and feels they are effective. She had a [facility] screening, which showed question of mild plaque in carotids. She is due for a bone density. She is due for a wellness visit and encouraged to do this. Review of Systems   Constitutional: Negative for chills, fever and weight loss. Respiratory: Negative for cough, shortness of breath and wheezing. Cardiovascular: Negative for chest pain, palpitations, orthopnea, leg swelling and PND. Gastrointestinal: Negative for abdominal pain, heartburn, nausea and vomiting. Musculoskeletal: Negative for myalgias. Neurological: Negative for dizziness and headaches. Psychiatric/Behavioral: Negative for depression. The patient does not have insomnia. Physical Exam   Constitutional: She is oriented to person, place, and time. She appears well-developed and well-nourished. HENT:   Head: Normocephalic and atraumatic. Neck: Normal range of motion. Neck supple. Carotid bruit is not present. No thyromegaly present. Cardiovascular: Normal rate, regular rhythm, S1 normal, S2 normal, normal heart sounds and intact distal pulses. No murmur heard. Pulmonary/Chest: Effort normal and breath sounds normal. No respiratory distress. She has no wheezes. She has no rales. Musculoskeletal: She exhibits no edema. Neurological: She is alert and oriented to person, place, and time. Psychiatric: She has a normal mood and affect. Her behavior is normal.   Nursing note and vitals reviewed. ASSESSMENT and PLAN  Diagnoses and all orders for this visit:    1.  Essential hypertension  - lisinopril (PRINIVIL, ZESTRIL) 40 mg tablet; Take 1 Tab by mouth daily. 2. Colon cancer screening  -     OCCULT BLOOD, IMMUNOASSAY (FIT)    3. Postmenopause  -     DEXA BONE DENSITY STUDY AXIAL; Future    4. Anxiety  Cont meds with mental health provider  5. Pure hypercholesterolemia    6. 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     7. Dyslipidemia, goal LDL below 211  -     METABOLIC PANEL, COMPREHENSIVE  -     LIPID PANEL  -     TSH 3RD GENERATION  -     atorvastatin (LIPITOR) 20 mg tablet; take 1 tablet by mouth once daily

## 2017-12-13 DIAGNOSIS — F41.9 ANXIETY: ICD-10-CM

## 2017-12-13 RX ORDER — BUPROPION HYDROCHLORIDE 150 MG/1
TABLET ORAL
Qty: 90 TAB | Refills: 0 | Status: SHIPPED | OUTPATIENT
Start: 2017-12-13 | End: 2018-04-17 | Stop reason: ALTCHOICE

## 2017-12-13 RX ORDER — BUPROPION HYDROCHLORIDE 150 MG/1
TABLET ORAL
Qty: 30 TAB | Refills: 0 | Status: SHIPPED | OUTPATIENT
Start: 2017-12-13 | End: 2017-12-13 | Stop reason: SDUPTHER

## 2017-12-21 DIAGNOSIS — G47.00 INSOMNIA, UNSPECIFIED TYPE: ICD-10-CM

## 2017-12-21 RX ORDER — TRAZODONE HYDROCHLORIDE 50 MG/1
TABLET ORAL
Qty: 30 TAB | Refills: 5 | Status: SHIPPED | OUTPATIENT
Start: 2017-12-21 | End: 2018-06-07 | Stop reason: ALTCHOICE

## 2017-12-30 DIAGNOSIS — E78.5 DYSLIPIDEMIA, GOAL LDL BELOW 100: ICD-10-CM

## 2017-12-30 RX ORDER — ATORVASTATIN CALCIUM 20 MG/1
TABLET, FILM COATED ORAL
Qty: 90 TAB | Refills: 1 | Status: SHIPPED | OUTPATIENT
Start: 2017-12-30 | End: 2018-04-17 | Stop reason: SDUPTHER

## 2018-01-03 DIAGNOSIS — F41.9 ANXIETY: ICD-10-CM

## 2018-01-03 RX ORDER — VENLAFAXINE HYDROCHLORIDE 75 MG/1
CAPSULE, EXTENDED RELEASE ORAL
Qty: 30 CAP | Refills: 0 | Status: SHIPPED | OUTPATIENT
Start: 2018-01-03 | End: 2018-02-02 | Stop reason: SDUPTHER

## 2018-01-08 ENCOUNTER — HOSPITAL ENCOUNTER (OUTPATIENT)
Dept: LAB | Age: 68
Discharge: HOME | End: 2018-01-08
Payer: MEDICARE

## 2018-01-08 PROCEDURE — 80061 LIPID PANEL: CPT

## 2018-01-08 PROCEDURE — 84443 ASSAY THYROID STIM HORMONE: CPT

## 2018-01-08 PROCEDURE — 80053 COMPREHEN METABOLIC PANEL: CPT

## 2018-01-08 PROCEDURE — 85025 COMPLETE CBC W/AUTO DIFF WBC: CPT

## 2018-01-08 PROCEDURE — 36415 COLL VENOUS BLD VENIPUNCTURE: CPT

## 2018-01-08 PROCEDURE — 82270 OCCULT BLOOD FECES: CPT

## 2018-01-09 LAB
ALBUMIN SERPL-MCNC: 4.4 G/DL (ref 3.6–4.8)
ALBUMIN/GLOB SERPL: 2.1 {RATIO} (ref 1.2–2.2)
ALP SERPL-CCNC: 71 IU/L (ref 39–117)
ALT SERPL-CCNC: 34 IU/L (ref 0–32)
AST SERPL-CCNC: 21 IU/L (ref 0–40)
BASOPHILS # BLD AUTO: 0 X10E3/UL (ref 0–0.2)
BASOPHILS NFR BLD AUTO: 0 %
BILIRUB SERPL-MCNC: 0.3 MG/DL (ref 0–1.2)
BUN SERPL-MCNC: 13 MG/DL (ref 8–27)
BUN/CREAT SERPL: 15 (ref 12–28)
CALCIUM SERPL-MCNC: 9.5 MG/DL (ref 8.7–10.3)
CHLORIDE SERPL-SCNC: 104 MMOL/L (ref 96–106)
CHOLEST SERPL-MCNC: 158 MG/DL (ref 100–199)
CO2 SERPL-SCNC: 23 MMOL/L (ref 18–29)
CREAT SERPL-MCNC: 0.86 MG/DL (ref 0.57–1)
EOSINOPHIL # BLD AUTO: 0.3 X10E3/UL (ref 0–0.4)
EOSINOPHIL NFR BLD AUTO: 5 %
ERYTHROCYTE [DISTWIDTH] IN BLOOD BY AUTOMATED COUNT: 13.3 % (ref 12.3–15.4)
GLOBULIN SER CALC-MCNC: 2.1 G/DL (ref 1.5–4.5)
GLUCOSE SERPL-MCNC: 115 MG/DL (ref 65–99)
HCT VFR BLD AUTO: 41.3 % (ref 34–46.6)
HDLC SERPL-MCNC: 43 MG/DL
HGB BLD-MCNC: 14 G/DL (ref 11.1–15.9)
IMM GRANULOCYTES # BLD: 0 X10E3/UL (ref 0–0.1)
IMM GRANULOCYTES NFR BLD: 0 %
INTERPRETATION, 910389: NORMAL
LDLC SERPL CALC-MCNC: 66 MG/DL (ref 0–99)
LYMPHOCYTES # BLD AUTO: 2.3 X10E3/UL (ref 0.7–3.1)
LYMPHOCYTES NFR BLD AUTO: 42 %
MCH RBC QN AUTO: 30.1 PG (ref 26.6–33)
MCHC RBC AUTO-ENTMCNC: 33.9 G/DL (ref 31.5–35.7)
MCV RBC AUTO: 89 FL (ref 79–97)
MONOCYTES # BLD AUTO: 0.5 X10E3/UL (ref 0.1–0.9)
MONOCYTES NFR BLD AUTO: 8 %
NEUTROPHILS # BLD AUTO: 2.5 X10E3/UL (ref 1.4–7)
NEUTROPHILS NFR BLD AUTO: 45 %
PLATELET # BLD AUTO: 274 X10E3/UL (ref 150–379)
POTASSIUM SERPL-SCNC: 4.4 MMOL/L (ref 3.5–5.2)
PROT SERPL-MCNC: 6.5 G/DL (ref 6–8.5)
RBC # BLD AUTO: 4.65 X10E6/UL (ref 3.77–5.28)
SODIUM SERPL-SCNC: 141 MMOL/L (ref 134–144)
TRIGL SERPL-MCNC: 246 MG/DL (ref 0–149)
TSH SERPL DL<=0.005 MIU/L-ACNC: 2.92 UIU/ML (ref 0.45–4.5)
VLDLC SERPL CALC-MCNC: 49 MG/DL (ref 5–40)
WBC # BLD AUTO: 5.6 X10E3/UL (ref 3.4–10.8)

## 2018-01-10 ENCOUNTER — HOSPITAL ENCOUNTER (OUTPATIENT)
Dept: BONE DENSITY | Age: 68
Discharge: HOME | End: 2018-01-10
Attending: INTERNAL MEDICINE
Payer: MEDICARE

## 2018-01-10 DIAGNOSIS — Z78.0 POSTMENOPAUSE: ICD-10-CM

## 2018-01-10 PROCEDURE — 77080 DXA BONE DENSITY AXIAL: CPT

## 2018-01-14 LAB — HEMOCCULT STL QL IA: NEGATIVE

## 2018-01-16 ENCOUNTER — HOSPITAL ENCOUNTER (OUTPATIENT)
Dept: LAB | Age: 68
Discharge: HOME | End: 2018-01-16

## 2018-01-22 RX ORDER — TOPIRAMATE 50 MG/1
TABLET, FILM COATED ORAL
Qty: 30 TAB | Refills: 5 | Status: SHIPPED | OUTPATIENT
Start: 2018-01-22

## 2018-02-02 DIAGNOSIS — F41.9 ANXIETY: ICD-10-CM

## 2018-02-02 RX ORDER — VENLAFAXINE HYDROCHLORIDE 75 MG/1
CAPSULE, EXTENDED RELEASE ORAL
Qty: 30 CAP | Refills: 0 | Status: SHIPPED | OUTPATIENT
Start: 2018-02-02 | End: 2018-03-05 | Stop reason: SDUPTHER

## 2018-03-05 DIAGNOSIS — F41.9 ANXIETY: ICD-10-CM

## 2018-03-05 RX ORDER — VENLAFAXINE HYDROCHLORIDE 75 MG/1
CAPSULE, EXTENDED RELEASE ORAL
Qty: 30 CAP | Refills: 0 | Status: SHIPPED | OUTPATIENT
Start: 2018-03-05 | End: 2018-04-17 | Stop reason: ALTCHOICE

## 2018-04-17 ENCOUNTER — HOSPITAL ENCOUNTER (OUTPATIENT)
Dept: LAB | Age: 68
Discharge: HOME | End: 2018-04-17
Payer: MEDICARE

## 2018-04-17 ENCOUNTER — OFFICE VISIT (OUTPATIENT)
Dept: INTERNAL MEDICINE CLINIC | Age: 68
End: 2018-04-17

## 2018-04-17 VITALS
HEIGHT: 62 IN | OXYGEN SATURATION: 99 % | WEIGHT: 138 LBS | RESPIRATION RATE: 16 BRPM | HEART RATE: 54 BPM | DIASTOLIC BLOOD PRESSURE: 87 MMHG | BODY MASS INDEX: 25.4 KG/M2 | SYSTOLIC BLOOD PRESSURE: 137 MMHG | TEMPERATURE: 97.9 F

## 2018-04-17 DIAGNOSIS — Z23 ENCOUNTER FOR IMMUNIZATION: ICD-10-CM

## 2018-04-17 DIAGNOSIS — I10 ESSENTIAL HYPERTENSION: ICD-10-CM

## 2018-04-17 DIAGNOSIS — Z00.00 MEDICARE ANNUAL WELLNESS VISIT, SUBSEQUENT: Primary | ICD-10-CM

## 2018-04-17 DIAGNOSIS — J98.11 ATELECTASIS, LEFT: ICD-10-CM

## 2018-04-17 DIAGNOSIS — R73.01 IFG (IMPAIRED FASTING GLUCOSE): ICD-10-CM

## 2018-04-17 DIAGNOSIS — Z12.39 BREAST CANCER SCREENING: ICD-10-CM

## 2018-04-17 DIAGNOSIS — E78.49 OTHER HYPERLIPIDEMIA: ICD-10-CM

## 2018-04-17 PROCEDURE — 83036 HEMOGLOBIN GLYCOSYLATED A1C: CPT

## 2018-04-17 PROCEDURE — 36415 COLL VENOUS BLD VENIPUNCTURE: CPT

## 2018-04-17 PROCEDURE — 80053 COMPREHEN METABOLIC PANEL: CPT

## 2018-04-17 RX ORDER — VALSARTAN 320 MG/1
320 TABLET ORAL DAILY
Qty: 30 TAB | Refills: 2 | Status: SHIPPED | OUTPATIENT
Start: 2018-04-17 | End: 2018-06-07 | Stop reason: ALTCHOICE

## 2018-04-17 RX ORDER — OMEPRAZOLE 20 MG/1
1 CAPSULE, DELAYED RELEASE ORAL DAILY
Refills: 0 | COMMUNITY
Start: 2018-04-09

## 2018-04-17 RX ORDER — FLUOXETINE HYDROCHLORIDE 20 MG/1
1 CAPSULE ORAL DAILY
Refills: 5 | COMMUNITY
Start: 2018-04-06

## 2018-04-17 RX ORDER — METHYLPHENIDATE HYDROCHLORIDE 18 MG/1
10 TABLET ORAL DAILY
Refills: 0 | COMMUNITY
Start: 2018-03-25

## 2018-04-17 NOTE — PROGRESS NOTES
Health Maintenance Due   Topic Date Due    ZOSTER VACCINE AGE 60>  05/18/2010    GLAUCOMA SCREENING Q2Y  07/18/2015    Pneumococcal 65+ Low/Medium Risk (1 of 2 - PCV13) 07/18/2015    Influenza Age 9 to Adult  08/01/2017    MEDICARE YEARLY EXAM  03/14/2018     Release signed for glaucoma screen - [facility]  Patient does not get flu shots

## 2018-04-17 NOTE — MR AVS SNAPSHOT
[address] [place] [address] 
 
 
 [address] [place] [address] 
[phone] Patient: Rigo Baugh MRN:  [id number] Visit Information Date & Time Provider Department Dept. Phone Encounter #  
 4/17/2018  2:30 PM August Celestin MD [facility] [phone] [id number] Upcoming Health Maintenance Date Due ZOSTER VACCINE AGE 60> 5/18/2010 GLAUCOMA SCREENING Q2Y 7/18/2015 Pneumococcal 65+ Low/Medium Risk (1 of 2 - PCV13) 7/18/2015 Influenza Age 5 to Adult 8/1/2017 MEDICARE YEARLY EXAM 3/14/2018 BREAST CANCER SCRN MAMMOGRAM 4/6/2019 COLONOSCOPY 2/18/2024 DTaP/Tdap/Td series (2 - Td) 12/11/2027 Allergies as of 4/17/2018  Review Complete On: 4/17/2018 By: August Celestin MD  
  
 Severity Noted Reaction Type Reactions Codeine  04/21/2009    Other (comments) Patient says she is not allergic Current Immunizations  Reviewed on 4/17/2018 Name Date Pneumococcal Polysaccharide (PPSV-23)  Incomplete Rabies Vaccine 6/4/2012  2:38 PM, 5/28/2012 11:51 AM, 5/24/2012  4:24 PM, 5/21/2012  5:41 PM  
 TD Vaccine 7/25/2007 Reviewed by August Celestin MD on 4/17/2018 at  2:49 PM  
 Reviewed by August Celestin MD on 4/17/2018 at  2:58 PM  
You Were Diagnosed With   
  
 Codes Comments Medicare annual wellness visit, subsequent    -  Primary ICD-10-CM: Z00.00 ICD-9-CM: V70.0 Breast cancer screening     ICD-10-CM: Z12.31 
ICD-9-CM: V76.10 Essential hypertension     ICD-10-CM: I10 
ICD-9-CM: 401.9 Atelectasis, left     ICD-10-CM: J98.11 ICD-9-CM: 518.0 Other hyperlipidemia     ICD-10-CM: E78.4 ICD-9-CM: 272.4 IFG (impaired fasting glucose)     ICD-10-CM: R73.01 
ICD-9-CM: 790.21 Encounter for immunization     ICD-10-CM: S87 ICD-9-CM: V03.89 Vitals BP Pulse Temp Resp Height(growth percentile) Weight(growth percentile) 137/87 (BP 1 Location: Left arm, BP Patient Position: Sitting) (!) 54 97.9 °F (36.6 °C) (Oral) 16 5' 2\" (1.575 m) 138 lb (62.6 kg) SpO2 BMI OB Status Smoking Status 99% 25.24 kg/m2 Postmenopausal Former Smoker Vitals History BMI and BSA Data Body Mass Index Body Surface Area  
 25.24 kg/m 2 1.65 m 2 Preferred Pharmacy Pharmacy Name Phone [address] [phone] Your Updated Medication List  
  
   
This list is accurate as of 4/17/18  3:10 PM.  Always use your most recent med list.  
  
  
  
  
 acyclovir 5 % ointment Commonly known as:  ZOVIRAX Apply  to affected area every three (3) hours. ADDERALL 10 mg tablet Generic drug:  dextroamphetamine-amphetamine Take 10 mg by mouth. ALPRAZolam 1 mg tablet Commonly known as:  Fairy Ny Take 1 mg by mouth as needed. atenolol 50 mg tablet Commonly known as:  TENORMIN  
take 1 tablet by mouth once daily  
  
 atorvastatin 20 mg tablet Commonly known as:  LIPITOR  
take 1 tablet by mouth once daily  
  
 diclofenac potassium 50 mg tablet Commonly known as:  CATAFLAM  
Take 1 Tab by mouth two (2) times daily as needed. FLUoxetine 20 mg capsule Commonly known as:  PROzac Take 1 Cap by mouth daily. metFORMIN  mg tablet Commonly known as:  GLUCOPHAGE XR  
take 1 tablet by mouth daily with DINNER  
  
 methylphenidate HCl 18 mg CR tablet Commonly known as:  CONCERTA Take 1 Tab by mouth daily. omeprazole 20 mg capsule Commonly known as:  PRILOSEC Take 1 Cap by mouth daily. topiramate 50 mg tablet Commonly known as:  TOPAMAX  
take 1 tablet by mouth once daily  
  
 traZODone 50 mg tablet Commonly known as:  DESYREL  
TAKE 1 TABLET BY MOUTH DAILY  
  
 VAGIFEM 10 mcg Tab vaginal tablet Generic drug:  estradiol  
as needed. valsartan 320 mg tablet Commonly known as:  DIOVAN Take 1 Tab by mouth daily. zolpidem 10 mg tablet Commonly known as:  AMBIEN Take 1 Tab by mouth nightly as needed. Prescriptions Sent to Pharmacy Refills  
 valsartan (DIOVAN) 320 mg tablet 2 Sig: Take 1 Tab by mouth daily. Class: Normal  
 Pharmacy: [facility] [address] Ph #: [phone] Route: Oral  
  
We Performed the Following HEMOGLOBIN A1C WITH EAG [65850 CPT(R)] METABOLIC PANEL, COMPREHENSIVE [41612 CPT(R)] PNEUMOCOCCAL POLYSACCHARIDE VACCINE, 23-VALENT, ADULT OR IMMUNOSUPPRESSED PT DOSE, [43613 CPT(R)] To-Do List   
 04/17/2018 Imaging:  DEBBIE 3D MORTEZA W MAMMO BI SCREENING INCL CAD   
  
 04/17/2018 Imaging:  XR CHEST PA LAT Patient Instructions Well Visit, Over 72: Care Instructions Your Care Instructions Physical exams can help you stay healthy. Your doctor has checked your overall health and may have suggested ways to take good care of yourself. He or she also may have recommended tests. At home, you can help prevent illness with healthy eating, regular exercise, and other steps. Follow-up care is a key part of your treatment and safety. Be sure to make and go to all appointments, and call your doctor if you are having problems. It's also a good idea to know your test results and keep a list of the medicines you take. How can you care for yourself at home? · Reach and stay at a healthy weight. This will lower your risk for many problems, such as obesity, diabetes, heart disease, and high blood pressure. · Get at least 30 minutes of exercise on most days of the week. Walking is a good choice. You also may want to do other activities, such as running, swimming, cycling, or playing tennis or team sports. · Do not smoke. Smoking can make health problems worse.  If you need help quitting, talk to your doctor about stop-smoking programs and medicines. These can increase your chances of quitting for good. · Protect your skin from too much sun. When you're outdoors from 10 a.m. to 4 p.m., stay in the shade or cover up with clothing and a hat with a wide brim. Wear sunglasses that block UV rays. Even when it's cloudy, put broad-spectrum sunscreen (SPF 30 or higher) on any exposed skin. · See a dentist one or two times a year for checkups and to have your teeth cleaned. · Wear a seat belt in the car. · Limit alcohol to 2 drinks a day for men and 1 drink a day for women. Too much alcohol can cause health problems. Follow your doctor's advice about when to have certain tests. These tests can spot problems early. For men and women · Cholesterol. Your doctor will tell you how often to have this done based on your overall health and other things that can increase your risk for heart attack and stroke. · Blood pressure. Have your blood pressure checked during a routine doctor visit. Your doctor will tell you how often to check your blood pressure based on your age, your blood pressure results, and other factors. · Diabetes. Ask your doctor whether you should have tests for diabetes. · Vision. Experts recommend that you have yearly exams for glaucoma and other age-related eye problems. · Hearing. Tell your doctor if you notice any change in your hearing. You can have tests to find out how well you hear. · Colon cancer tests. Keep having colon cancer tests as your doctor recommends. You can have one of several types of tests. · Heart attack and stroke risk. At least every 4 to 6 years, you should have your risk for heart attack and stroke assessed. Your doctor uses factors such as your age, blood pressure, cholesterol, and whether you smoke or have diabetes to show what your risk for a heart attack or stroke is over the next 10 years. · Osteoporosis. Talk to your doctor about whether you should have a bone density test to find out whether you have thinning bones. Also ask your doctor about whether you should take calcium and vitamin D supplements. For women · Pap test and pelvic exam. You may no longer need a Pap test. Talk with your doctor about whether to stop or continue to have Pap tests. · Breast exam and mammogram. Ask how often you should have a mammogram, which is an X-ray of your breasts. A mammogram can spot breast cancer before it can be felt and when it is easiest to treat. · Thyroid disease. Talk to your doctor about whether to have your thyroid checked as part of a regular physical exam. Women have an increased chance of a thyroid problem. For men · Prostate exam. Talk to your doctor about whether you should have a blood test (called a PSA test) for prostate cancer. Experts disagree on whether men should have this test. Some experts recommend that you discuss the benefits and risks of the test with your doctor. · Abdominal aortic aneurysm. Ask your doctor whether you should have a test to check for an aneurysm. You may need a test if you ever smoked or if your parent, brother, sister, or child has had an aneurysm. When should you call for help? Watch closely for changes in your health, and be sure to contact your doctor if you have any problems or symptoms that concern you. Where can you learn more? Go to http://peri-akhil.info/. Enter K482 in the search box to learn more about \"Well Visit, Over 65: Care Instructions. \" Current as of: May 12, 2017 Content Version: 11.4 © 9740-4298 [employer]wise, [employer]orporated. Care instructions adapted under license by [employer] (which disclaims liability or warranty for this information).  If you have questions about a medical condition or this instruction, always ask your healthcare professional. Vicky Sor[employer] disclaims any warranty or liability for your use of this information. Introducing [facility]! Dear Tanna Beckford: Thank you for requesting a PSS Systems account. Our records indicate that you already have an active PSS Systems account. You can access your account anytime at [email or web address] Did you know that you can access your hospital and ER discharge instructions at any time in [employer] Systems? You can also review all of your test results from your hospital stay or ER visit. Additional Information If you have questions, please visit the Frequently Asked Questions section of the [employer] Systems website at [email or web address] Remember, P[employer] Systems is NOT to be used for urgent needs. For medical emergencies, dial 911. Now available from your iPhone and Android! Please provide this summary of care documentation to your next provider. Your primary care clinician is listed as Fernanda 68. If you have any questions after today's visit, please call [phone].

## 2018-04-17 NOTE — PATIENT INSTRUCTIONS
Well Visit, Over 72: Care Instructions  Your Care Instructions    Physical exams can help you stay healthy. Your doctor has checked your overall health and may have suggested ways to take good care of yourself. He or she also may have recommended tests. At home, you can help prevent illness with healthy eating, regular exercise, and other steps. Follow-up care is a key part of your treatment and safety. Be sure to make and go to all appointments, and call your doctor if you are having problems. It's also a good idea to know your test results and keep a list of the medicines you take. How can you care for yourself at home? · Reach and stay at a healthy weight. This will lower your risk for many problems, such as obesity, diabetes, heart disease, and high blood pressure. · Get at least 30 minutes of exercise on most days of the week. Walking is a good choice. You also may want to do other activities, such as running, swimming, cycling, or playing tennis or team sports. · Do not smoke. Smoking can make health problems worse. If you need help quitting, talk to your doctor about stop-smoking programs and medicines. These can increase your chances of quitting for good. · Protect your skin from too much sun. When you're outdoors from 10 a.m. to 4 p.m., stay in the shade or cover up with clothing and a hat with a wide brim. Wear sunglasses that block UV rays. Even when it's cloudy, put broad-spectrum sunscreen (SPF 30 or higher) on any exposed skin. · See a dentist one or two times a year for checkups and to have your teeth cleaned. · Wear a seat belt in the car. · Limit alcohol to 2 drinks a day for men and 1 drink a day for women. Too much alcohol can cause health problems. Follow your doctor's advice about when to have certain tests. These tests can spot problems early. For men and women  · Cholesterol.  Your doctor will tell you how often to have this done based on your overall health and other things that can increase your risk for heart attack and stroke. · Blood pressure. Have your blood pressure checked during a routine doctor visit. Your doctor will tell you how often to check your blood pressure based on your age, your blood pressure results, and other factors. · Diabetes. Ask your doctor whether you should have tests for diabetes. · Vision. Experts recommend that you have yearly exams for glaucoma and other age-related eye problems. · Hearing. Tell your doctor if you notice any change in your hearing. You can have tests to find out how well you hear. · Colon cancer tests. Keep having colon cancer tests as your doctor recommends. You can have one of several types of tests. · Heart attack and stroke risk. At least every 4 to 6 years, you should have your risk for heart attack and stroke assessed. Your doctor uses factors such as your age, blood pressure, cholesterol, and whether you smoke or have diabetes to show what your risk for a heart attack or stroke is over the next 10 years. · Osteoporosis. Talk to your doctor about whether you should have a bone density test to find out whether you have thinning bones. Also ask your doctor about whether you should take calcium and vitamin D supplements. For women  · Pap test and pelvic exam. You may no longer need a Pap test. Talk with your doctor about whether to stop or continue to have Pap tests. · Breast exam and mammogram. Ask how often you should have a mammogram, which is an X-ray of your breasts. A mammogram can spot breast cancer before it can be felt and when it is easiest to treat. · Thyroid disease. Talk to your doctor about whether to have your thyroid checked as part of a regular physical exam. Women have an increased chance of a thyroid problem. For men  · Prostate exam. Talk to your doctor about whether you should have a blood test (called a PSA test) for prostate cancer.  Experts disagree on whether men should have this test. Some experts recommend that you discuss the benefits and risks of the test with your doctor. · Abdominal aortic aneurysm. Ask your doctor whether you should have a test to check for an aneurysm. You may need a test if you ever smoked or if your parent, brother, sister, or child has had an aneurysm. When should you call for help? Watch closely for changes in your health, and be sure to contact your doctor if you have any problems or symptoms that concern you. Where can you learn more? Go to http://peri-akhil.info/. Enter Y485 in the search box to learn more about \"Well Visit, Over 65: Care Instructions. \"  Current as of: May 12, 2017  Content Version: 11.4  © 0853-1042 [employer]. Care instructions adapted under license by [employer] (which disclaims liability or warranty for this information). If you have questions about a medical condition or this instruction, always ask your healthcare professional. [place] [address] any warranty or liability for your use of this information. Medicare Wellness Visit, Female    The best way to live healthy is to have a healthy lifestyle by eating a well-balanced diet, exercising regularly, limiting alcohol and stopping smoking. Regular physical exams and screening tests are another way to keep healthy. Preventive exams provided by your health care provider can find health problems before they become diseases or illnesses. Preventive services including immunizations, screening tests, monitoring and exams can help you take care of your own health. All people over age 72 should have a pneumovax  and and a prevnar shot to prevent pneumonia. These are once in a lifetime unless you and your provider decide differently. All people over 65 should have a yearly flu shot and a tetanus vaccine every 10 years.     A bone mass density to screen for osteoporosis or thinning of the bones should be done every 2 years after 72.    Screening for diabetes mellitus with a blood sugar test should be done every year. Glaucoma is a disease of the eye due to increased ocular pressure that can lead to blindness and it should be done every year by an eye professional.    Cardiovascular screening tests that check for elevated lipids (fatty part of blood) which can lead to heart disease and strokes should be done every 5 years. Colorectal screening that evaluates for blood or polyps in your colon should be done yearly as a stool test or every five years as a flexible sigmoidoscope or every 10 years as a colonoscopy up to age 76. Breast cancer screening with a mammogram is recommended biennially  for women age 54-69. Screening for cervical cancer with a pap smear and pelvic exam is recommended for women after age 72 years every 2 years up to age 79 or when the provider and patient decide to stop. If there is a history of cervical abnormalities or other increased risk for cancer then the test is recommended yearly. Hepatitis C screening is also recommended for anyone born between [birth year] through [address]. A shingles vaccine is also recommended once in a lifetime after age 61. Your Medicare Wellness Exam is recommended annually.     Here is a list of your current Health Maintenance items with a due date:  Health Maintenance Due   Topic Date Due    Shingles Vaccine  05/18/2010    Glaucoma Screening   07/18/2015    Pneumococcal Vaccine (1 of 2 - PCV13) 07/18/2015    Flu Vaccine  08/01/2017    Annual Well Visit  03/14/2018

## 2018-04-17 NOTE — PROGRESS NOTES
This is the Subsequent Medicare Annual Wellness Exam, performed 12 months or more after the Initial AWV or the last Subsequent AWV    I have reviewed the patient's medical history in detail and updated the computerized patient record. History     Past Medical History:   Diagnosis Date    ADD (attention deficit disorder) 5/10/2017    Sees rene Blanchard for meds    Anxiety     Basal cell carcinoma, eyelid     right, Dr. Saima Jarvis, Dr. Joselito Elizalde Environmental allergies     GERD (gastroesophageal reflux disease)     HTN (hypertension) 2011    Hyperlipidemia     Insomnia     Menopause 2009    MVP (mitral valve prolapse) 2009    rare palpitations. mild-mod regurg 2011    Normal stress echocardiogram 10/2009    Recurrent cold sores       Past Surgical History:   Procedure Laterality Date    BREAST SURGERY PROCEDURE UNLISTED      breast reduction  Dr Gunn Copper    HX BREAST REDUCTION Bilateral     HX GYN           Current Outpatient Prescriptions   Medication Sig Dispense Refill    methylphenidate ER 18 mg 24 hr tab Take 1 Tab by mouth daily. 0    omeprazole (PRILOSEC) 20 mg capsule Take 1 Cap by mouth daily. 0    valsartan (DIOVAN) 320 mg tablet Take 1 Tab by mouth daily. 30 Tab 2    topiramate (TOPAMAX) 50 mg tablet take 1 tablet by mouth once daily 30 Tab 5    traZODone (DESYREL) 50 mg tablet TAKE 1 TABLET BY MOUTH DAILY 30 Tab 5    diclofenac potassium (CATAFLAM) 50 mg tablet Take 1 Tab by mouth two (2) times daily as needed. 3    zolpidem (AMBIEN) 10 mg tablet Take 1 Tab by mouth nightly as needed. 0    acyclovir (ZOVIRAX) 5 % ointment Apply  to affected area every three (3) hours.  2 g 0    atorvastatin (LIPITOR) 20 mg tablet take 1 tablet by mouth once daily 90 Tab 1    metFORMIN ER (GLUCOPHAGE XR) 500 mg tablet take 1 tablet by mouth daily with DINNER 901 Tab 2    atenolol (TENORMIN) 50 mg tablet take 1 tablet by mouth once daily 90 Tab 3    VAGIFEM 10 mcg tab vaginal tablet as needed. 0    ALPRAZolam (XANAX) 1 mg tablet Take 1 mg by mouth as needed. 0    FLUoxetine (PROZAC) 20 mg capsule Take 1 Cap by mouth daily. 5    dextroamphetamine-amphetamine (ADDERALL) 10 mg tablet Take 10 mg by mouth. Allergies   Allergen Reactions    Codeine Other (comments)     Patient says she is not allergic     Family History   Problem Relation Age of Onset    Cancer Father      lung    Hypertension Brother      Social History   Substance Use Topics    Smoking status: Former Smoker     Quit date: 1/1/1990    Smokeless tobacco: Never Used      Comment: quit 1990    Alcohol use 1.8 - 2.4 oz/week     3 - 4 Glasses of wine per week     Patient Active Problem List   Diagnosis Code    GERD (gastroesophageal reflux disease) K21.9    MVP (mitral valve prolapse) I34.1    Anxiety F41.9    Menopause Z78.0    HTN (hypertension) I10    Basal cell carcinoma of eyelid, including canthus C44.111    Hyperlipidemia E78.5    Insomnia G47.00    Recurrent cold sores B00.1    Normal stress echocardiogram     Environmental allergies Z91.09    ADD (attention deficit disorder) F98.8       Depression Risk Factor Screening:     PHQ over the last two weeks 4/17/2018   Little interest or pleasure in doing things Not at all   Feeling down, depressed or hopeless Not at all   Total Score PHQ 2 0     Alcohol Risk Factor Screening:     Admits to drinking more than ideal-about 7-8 day per week  Functional Ability and Level of Safety:   Hearing Loss  Hearing is good. The patient needs further evaluation. Activities of Daily Living  The home contains: no safety equipment. Patient does total self care    Fall Risk  Fall Risk Assessment, last 12 mths 4/17/2018   Able to walk? Yes   Fall in past 12 months?  No       Abuse Screen  Patient is not abused    Cognitive Screening   Evaluation of Cognitive Function:  Has your family/caregiver stated any concerns about your memory: no  Normal    Patient Care Team   Patient Care Team:  Eh Mendoza MD as PCP - General (Internal Medicine)    Assessment/Plan   Education and counseling provided:  Pneumococcal Vaccine  Screening Mammography  Colorectal cancer screening tests  Bone mass measurement (DEXA)    Diagnoses and all orders for this visit:    1. Medicare annual wellness visit, subsequent    2. Breast cancer screening  -     [facility] 3D M[facility] W MAMMO BI SCREENING INCL CAD; Future    3. Essential hypertension-not controlled change meds and appt in 6 weeks  -     valsartan (DIOVAN) 320 mg tablet; Take 1 Tab by mouth daily. 4. Atelectasis, left  -     XR CHEST PA LAT; Future    5. Other hyperlipidemia    6. IFG (impaired fasting glucose)  -     METABOLIC PANEL, COMPREHENSIVE  -     HEMOGLOBIN A1C WITH EAG    7.  Encounter for immunization  -     Pneumococcal polysaccharide vaccine, 23-valent, adult or immunosuppressed pt dose        Health Maintenance Due   Topic Date Due    ZOSTER VACCINE AGE 60>  05/18/2010    GLAUCOMA SCREENING Q2Y  07/18/2015    Pneumococcal 65+ Low/Medium Risk (1 of 2 - PCV13) 07/18/2015    Influenza Age 9 to Adult  08/01/2017    MEDICARE YEARLY EXAM  03/14/2018

## 2018-04-17 NOTE — PROGRESS NOTES
HISTORY OF PRESENT ILLNESS  Keila Herbert is a 79 y.o. female. HPI  Seen for wellness visit and follow up pn meds. Issues:  1. Hypertension, on Lisinopril 40. BP is running high. She is on new meds from Dr. John Goodrich for her ADD, but the Concerta has helped with focus and concentration so she'd like to continue it. She's also drinking at least seven glasses of alcohol a week, sometimes more, and we discussed this could impact her blood pressure. I'm going to change Lisinopril to Diovan. Asked her to back off on alcohol. 2. Glucose of 115. Remains on one Metformin daily. No diarrhea. Will check an A1c.  3. Preventive care. Mammogram is due. Colonoscopy done in 2014. Bone density from 2018 showed T score of -1.6 in hip, 1.1 in spine. Overall this is stable. 4. She has some right knee pain and pain in right foot. It is not limiting activity. Review of Systems   Constitutional: Negative for chills, fever and weight loss. HENT: Negative for hearing loss. Respiratory: Negative for cough, shortness of breath and wheezing. Cardiovascular: Negative for chest pain, palpitations, orthopnea, leg swelling and PND. Gastrointestinal: Negative for abdominal pain, diarrhea, heartburn and nausea. Genitourinary: Positive for urgency. Negative for dysuria. Musculoskeletal: Positive for joint pain. Negative for myalgias. Neurological: Negative for dizziness and headaches. Psychiatric/Behavioral: Negative for depression. Physical Exam   Constitutional: She is oriented to person, place, and time. She appears well-developed and well-nourished. HENT:   Head: Normocephalic and atraumatic. Right Ear: Tympanic membrane, external ear and ear canal normal.   Left Ear: Tympanic membrane, external ear and ear canal normal.   Nose: Nose normal.   Mouth/Throat: Oropharynx is clear and moist and mucous membranes are normal. No oropharyngeal exudate.    Eyes: Conjunctivae are normal. Pupils are equal, round, and reactive to light. Right eye exhibits no discharge. Left eye exhibits no discharge. Neck: Normal range of motion. Neck supple. Carotid bruit is not present. No thyromegaly present. Cardiovascular: Normal rate, regular rhythm, S1 normal, S2 normal, normal heart sounds and intact distal pulses. No murmur heard. Pulmonary/Chest: Effort normal and breath sounds normal. No respiratory distress. She has no wheezes. She has no rales. Breast exam bilaterally without masses axillary nodes or discharge. BSE reviewed      Abdominal: Soft. Bowel sounds are normal. She exhibits no distension and no mass. There is no tenderness. Musculoskeletal: She exhibits no edema. Lymphadenopathy:     She has no cervical adenopathy. Neurological: She is alert and oriented to person, place, and time. Skin: No rash noted. Psychiatric: She has a normal mood and affect. Her behavior is normal.   Nursing note and vitals reviewed. ASSESSMENT and PLAN  Diagnoses and all orders for this visit:    1. Medicare annual wellness visit, subsequent    2. Breast cancer screening  -     [facility] 3D M[facility] W MAMMO BI SCREENING INCL CAD; Future    3. Essential hypertension  -     valsartan (DIOVAN) 320 mg tablet; Take 1 Tab by mouth daily. 4. Atelectasis, left  -     XR CHEST PA LAT; Future    5. Other hyperlipidemia    6. IFG (impaired fasting glucose)  -     METABOLIC PANEL, COMPREHENSIVE  -     HEMOGLOBIN A1C WITH EAG    7.  Encounter for immunization  -     Pneumococcal polysaccharide vaccine, 23-valent, adult or immunosuppressed pt dose      the following changes in treatment are made: change lisinopril to diovan and appt in 6 weeks  Dec etoh use

## 2018-04-18 LAB
ALBUMIN SERPL-MCNC: 4.6 G/DL (ref 3.6–4.8)
ALBUMIN/GLOB SERPL: 2.2 {RATIO} (ref 1.2–2.2)
ALP SERPL-CCNC: 72 IU/L (ref 39–117)
ALT SERPL-CCNC: 35 IU/L (ref 0–32)
AST SERPL-CCNC: 26 IU/L (ref 0–40)
BILIRUB SERPL-MCNC: 0.5 MG/DL (ref 0–1.2)
BUN SERPL-MCNC: 11 MG/DL (ref 8–27)
BUN/CREAT SERPL: 15 (ref 12–28)
CALCIUM SERPL-MCNC: 9.4 MG/DL (ref 8.7–10.3)
CHLORIDE SERPL-SCNC: 106 MMOL/L (ref 96–106)
CO2 SERPL-SCNC: 20 MMOL/L (ref 18–29)
CREAT SERPL-MCNC: 0.71 MG/DL (ref 0.57–1)
EST. AVERAGE GLUCOSE BLD GHB EST-MCNC: 111 MG/DL
GFR SERPLBLD CREATININE-BSD FMLA CKD-EPI: 102 ML/MIN/1.73
GFR SERPLBLD CREATININE-BSD FMLA CKD-EPI: 88 ML/MIN/1.73
GLOBULIN SER CALC-MCNC: 2.1 G/DL (ref 1.5–4.5)
GLUCOSE SERPL-MCNC: 114 MG/DL (ref 65–99)
HBA1C MFR BLD: 5.5 % (ref 4.8–5.6)
POTASSIUM SERPL-SCNC: 4.5 MMOL/L (ref 3.5–5.2)
PROT SERPL-MCNC: 6.7 G/DL (ref 6–8.5)
SODIUM SERPL-SCNC: 142 MMOL/L (ref 134–144)

## 2018-04-20 ENCOUNTER — HOSPITAL ENCOUNTER (OUTPATIENT)
Dept: GENERAL RADIOLOGY | Age: 68
Discharge: HOME | End: 2018-04-20
Payer: MEDICARE

## 2018-04-20 DIAGNOSIS — J98.11 ATELECTASIS, LEFT: ICD-10-CM

## 2018-04-20 PROCEDURE — 71046 X-RAY EXAM CHEST 2 VIEWS: CPT

## 2018-04-24 ENCOUNTER — HOSPITAL ENCOUNTER (OUTPATIENT)
Dept: MAMMOGRAPHY | Age: 68
Discharge: HOME | End: 2018-04-24
Attending: INTERNAL MEDICINE
Payer: MEDICARE

## 2018-04-24 ENCOUNTER — PATIENT MESSAGE (OUTPATIENT)
Dept: INTERNAL MEDICINE CLINIC | Age: 68
End: 2018-04-24

## 2018-04-24 DIAGNOSIS — Z12.39 BREAST CANCER SCREENING: ICD-10-CM

## 2018-04-24 PROCEDURE — 77063 BREAST TOMOSYNTHESIS BI: CPT

## 2018-04-26 NOTE — TELEPHONE ENCOUNTER
From: Oswaldo Woods MD  To: Edmundo Barnett  Sent: 4/24/2018 6:56 PM EDT  Subject: mammogram    I am glad to see your normal report from radiology. Please repeat in 1 year. Take care.   Chalo Tello

## 2018-05-14 ENCOUNTER — OFFICE VISIT (OUTPATIENT)
Dept: INTERNAL MEDICINE CLINIC | Age: 68
End: 2018-05-14

## 2018-05-14 ENCOUNTER — HOSPITAL ENCOUNTER (OUTPATIENT)
Dept: LAB | Age: 68
Discharge: HOME | End: 2018-05-14
Payer: MEDICARE

## 2018-05-14 VITALS
DIASTOLIC BLOOD PRESSURE: 64 MMHG | TEMPERATURE: 98.9 F | HEIGHT: 62 IN | RESPIRATION RATE: 14 BRPM | BODY MASS INDEX: 24.66 KG/M2 | SYSTOLIC BLOOD PRESSURE: 179 MMHG | HEART RATE: 55 BPM | WEIGHT: 134 LBS | OXYGEN SATURATION: 98 %

## 2018-05-14 DIAGNOSIS — I10 ESSENTIAL HYPERTENSION: Primary | ICD-10-CM

## 2018-05-14 DIAGNOSIS — F41.9 ANXIETY: ICD-10-CM

## 2018-05-14 PROCEDURE — 80076 HEPATIC FUNCTION PANEL: CPT

## 2018-05-14 PROCEDURE — 36415 COLL VENOUS BLD VENIPUNCTURE: CPT

## 2018-05-14 PROCEDURE — 80048 BASIC METABOLIC PNL TOTAL CA: CPT

## 2018-05-14 RX ORDER — HYDROCHLOROTHIAZIDE 25 MG/1
25 TABLET ORAL DAILY
Qty: 30 TAB | Refills: 3 | Status: SHIPPED | OUTPATIENT
Start: 2018-05-14 | End: 2018-06-07 | Stop reason: ALTCHOICE

## 2018-05-14 RX ORDER — LISINOPRIL 40 MG/1
40 TABLET ORAL DAILY
Qty: 30 TAB | Refills: 3 | Status: SHIPPED | OUTPATIENT
Start: 2018-05-14

## 2018-05-14 NOTE — PROGRESS NOTES
HISTORY OF PRESENT ILLNESS  Mariposa Crawley is a 79 y.o. female. HPI   Hypertension ROS: taking medications as instructed, no medication side effects noted, no TIA's, no chest pain on exertion, no dyspnea on exertion, no swelling of ankles. New concerns:  Patient's BP in office today is 179/64. Patient reports she had started Concerta and had a follow up. Her BP was 137/87 and she had BP medications changed from Lisinopril to Valsartan. She states her BP has been elevated since switching to Valsartan. She has been holding Concerta. Patient states her mood is stable. She is under some stress worrying about her BP. Patient states she has been feeling muscle cramps. She was on Diclofenac but has been out of meds, followed by rheumatology    Review of Systems   All other systems reviewed and are negative. Physical Exam   Constitutional: She is oriented to person, place, and time. She appears well-developed and well-nourished. HENT:   Head: Normocephalic and atraumatic. Right Ear: External ear normal.   Left Ear: External ear normal.   Nose: Nose normal.   Mouth/Throat: Oropharynx is clear and moist.   Eyes: Conjunctivae and EOM are normal.   Neck: Normal range of motion. Neck supple. Carotid bruit is not present. No thyroid mass and no thyromegaly present. Cardiovascular: Normal rate, regular rhythm, S1 normal, S2 normal, normal heart sounds and intact distal pulses. Pulmonary/Chest: Effort normal and breath sounds normal.   Abdominal: Soft. Normal appearance and bowel sounds are normal. There is no hepatosplenomegaly. There is no tenderness. Musculoskeletal: Normal range of motion. Neurological: She is alert and oriented to person, place, and time. She has normal strength. No cranial nerve deficit or sensory deficit. Coordination normal.   Skin: Skin is warm, dry and intact. No abrasion and no rash noted. Psychiatric: She has a normal mood and affect.  Her behavior is normal. Judgment and thought content normal.   Nursing note and vitals reviewed. ASSESSMENT and PLAN  Diagnoses and all orders for this visit:    1. Essential hypertension   Patient will stop Valsartan. She will resume lisinopril and add HCTZ. We will see how that works and [facility]  -     METABOLIC PANEL, BASIC  -     HEPATIC FUNCTION PANEL    2. Anxiety  Stable and well managed. Continue current medications. Other orders  -     lisinopril (PRINIVIL, ZESTRIL) 40 mg tablet; Take 1 Tab by mouth daily. -     hydroCHLOROthiazide (HYDRODIURIL) 25 mg tablet; Take 1 Tab by mouth daily. lab results and schedule of future lab studies reviewed with patient  reviewed diet, exercise and weight control    Written by Nina Keane, as dictated by Dennis Hall MD.     Current diagnosis and concerns discussed with pt at length. Understands risks and benefits or current treatment plan and medications and accepts the treatment and medication with any possible risks. Pt asks appropriate questions which were answered. Pt instructed to call with any concerns or problems.

## 2018-05-15 ENCOUNTER — PATIENT MESSAGE (OUTPATIENT)
Dept: INTERNAL MEDICINE CLINIC | Age: 68
End: 2018-05-15

## 2018-05-15 LAB
ALBUMIN SERPL-MCNC: 4.9 G/DL (ref 3.6–4.8)
ALP SERPL-CCNC: 73 IU/L (ref 39–117)
ALT SERPL-CCNC: 28 IU/L (ref 0–32)
AST SERPL-CCNC: 24 IU/L (ref 0–40)
BILIRUB DIRECT SERPL-MCNC: 0.15 MG/DL (ref 0–0.4)
BILIRUB SERPL-MCNC: 0.6 MG/DL (ref 0–1.2)
BUN SERPL-MCNC: 11 MG/DL (ref 8–27)
BUN/CREAT SERPL: 13 (ref 12–28)
CALCIUM SERPL-MCNC: 10.1 MG/DL (ref 8.7–10.3)
CHLORIDE SERPL-SCNC: 105 MMOL/L (ref 96–106)
CO2 SERPL-SCNC: 21 MMOL/L (ref 18–29)
CREAT SERPL-MCNC: 0.86 MG/DL (ref 0.57–1)
GFR SERPLBLD CREATININE-BSD FMLA CKD-EPI: 70 ML/MIN/1.73
GFR SERPLBLD CREATININE-BSD FMLA CKD-EPI: 81 ML/MIN/1.73
GLUCOSE SERPL-MCNC: 112 MG/DL (ref 65–99)
POTASSIUM SERPL-SCNC: 4.5 MMOL/L (ref 3.5–5.2)
PROT SERPL-MCNC: 7 G/DL (ref 6–8.5)
SODIUM SERPL-SCNC: 142 MMOL/L (ref 134–144)

## 2018-05-16 NOTE — TELEPHONE ENCOUNTER
From: Meir Rivera MD  To: Jaclyn Avila  Sent: 5/15/2018 9:20 PM EDT  Subject: labs    Kidney and liver function look good!

## 2018-05-28 ENCOUNTER — HOSPITAL ENCOUNTER (EMERGENCY)
Age: 68
Discharge: HOME | End: 2018-05-28
Attending: EMERGENCY MEDICINE
Payer: MEDICARE

## 2018-05-28 ENCOUNTER — APPOINTMENT (OUTPATIENT)
Dept: GENERAL RADIOLOGY | Age: 68
End: 2018-05-28
Attending: EMERGENCY MEDICINE
Payer: MEDICARE

## 2018-05-28 VITALS
TEMPERATURE: 98.4 F | RESPIRATION RATE: 15 BRPM | HEART RATE: 61 BPM | DIASTOLIC BLOOD PRESSURE: 90 MMHG | SYSTOLIC BLOOD PRESSURE: 158 MMHG | HEIGHT: 62 IN | BODY MASS INDEX: 24.59 KG/M2 | WEIGHT: 133.6 LBS | OXYGEN SATURATION: 100 %

## 2018-05-28 DIAGNOSIS — R11.0 NAUSEA WITHOUT VOMITING: Primary | ICD-10-CM

## 2018-05-28 DIAGNOSIS — R07.89 ATYPICAL CHEST PAIN: ICD-10-CM

## 2018-05-28 LAB
ALBUMIN SERPL-MCNC: 4.3 G/DL (ref 3.5–5)
ALBUMIN/GLOB SERPL: 1.3 {RATIO} (ref 1.1–2.2)
ALP SERPL-CCNC: 73 U/L (ref 45–117)
ALT SERPL-CCNC: 27 U/L (ref 12–78)
ANION GAP SERPL CALC-SCNC: 9 MMOL/L (ref 5–15)
APTT PPP: 26.3 SEC (ref 22.1–32)
AST SERPL-CCNC: 24 U/L (ref 15–37)
BASOPHILS # BLD: 0 K/UL (ref 0–0.1)
BASOPHILS NFR BLD: 0 % (ref 0–1)
BILIRUB SERPL-MCNC: 0.4 MG/DL (ref 0.2–1)
BNP SERPL-MCNC: 312 PG/ML (ref 0–125)
BUN SERPL-MCNC: 15 MG/DL (ref 6–20)
BUN/CREAT SERPL: 15 (ref 12–20)
CALCIUM SERPL-MCNC: 9.4 MG/DL (ref 8.5–10.1)
CHLORIDE SERPL-SCNC: 108 MMOL/L (ref 97–108)
CK SERPL-CCNC: 104 U/L (ref 26–192)
CO2 SERPL-SCNC: 23 MMOL/L (ref 21–32)
CREAT SERPL-MCNC: 0.99 MG/DL (ref 0.55–1.02)
DIFFERENTIAL METHOD BLD: NORMAL
EOSINOPHIL # BLD: 0 K/UL (ref 0–0.4)
EOSINOPHIL NFR BLD: 0 % (ref 0–7)
ERYTHROCYTE [DISTWIDTH] IN BLOOD BY AUTOMATED COUNT: 12.3 % (ref 11.5–14.5)
GLOBULIN SER CALC-MCNC: 3.3 G/DL (ref 2–4)
GLUCOSE SERPL-MCNC: 135 MG/DL (ref 65–100)
HCT VFR BLD AUTO: 39.8 % (ref 35–47)
HGB BLD-MCNC: 13.9 G/DL (ref 11.5–16)
IMM GRANULOCYTES # BLD: 0 K/UL (ref 0–0.04)
IMM GRANULOCYTES NFR BLD AUTO: 0 % (ref 0–0.5)
INR PPP: 1 (ref 0.9–1.1)
LYMPHOCYTES # BLD: 2.4 K/UL (ref 0.8–3.5)
LYMPHOCYTES NFR BLD: 24 % (ref 12–49)
MCH RBC QN AUTO: 31.4 PG (ref 26–34)
MCHC RBC AUTO-ENTMCNC: 34.9 G/DL (ref 30–36.5)
MCV RBC AUTO: 90 FL (ref 80–99)
MONOCYTES # BLD: 0.7 K/UL (ref 0–1)
MONOCYTES NFR BLD: 7 % (ref 5–13)
NEUTS SEG # BLD: 6.8 K/UL (ref 1.8–8)
NEUTS SEG NFR BLD: 67 % (ref 32–75)
NRBC # BLD: 0 K/UL (ref 0–0.01)
NRBC BLD-RTO: 0 PER 100 WBC
PLATELET # BLD AUTO: 231 K/UL (ref 150–400)
PMV BLD AUTO: 10.3 FL (ref 8.9–12.9)
POTASSIUM SERPL-SCNC: 3.8 MMOL/L (ref 3.5–5.1)
PROT SERPL-MCNC: 7.6 G/DL (ref 6.4–8.2)
PROTHROMBIN TIME: 10.3 SEC (ref 9–11.1)
RBC # BLD AUTO: 4.42 M/UL (ref 3.8–5.2)
SODIUM SERPL-SCNC: 140 MMOL/L (ref 136–145)
THERAPEUTIC RANGE,PTTT: NORMAL SECS (ref 58–77)
TROPONIN I BLD-MCNC: <0.04 NG/ML (ref 0–0.08)
TROPONIN I SERPL-MCNC: <0.04 NG/ML
WBC # BLD AUTO: 10 K/UL (ref 3.6–11)

## 2018-05-28 PROCEDURE — 84484 ASSAY OF TROPONIN QUANT: CPT | Performed by: PHYSICIAN ASSISTANT

## 2018-05-28 PROCEDURE — 82550 ASSAY OF CK (CPK): CPT | Performed by: EMERGENCY MEDICINE

## 2018-05-28 PROCEDURE — 85025 COMPLETE CBC W/AUTO DIFF WBC: CPT | Performed by: PHYSICIAN ASSISTANT

## 2018-05-28 PROCEDURE — 93005 ELECTROCARDIOGRAM TRACING: CPT

## 2018-05-28 PROCEDURE — 85610 PROTHROMBIN TIME: CPT | Performed by: EMERGENCY MEDICINE

## 2018-05-28 PROCEDURE — 80053 COMPREHEN METABOLIC PANEL: CPT | Performed by: PHYSICIAN ASSISTANT

## 2018-05-28 PROCEDURE — 71045 X-RAY EXAM CHEST 1 VIEW: CPT

## 2018-05-28 PROCEDURE — 36415 COLL VENOUS BLD VENIPUNCTURE: CPT | Performed by: PHYSICIAN ASSISTANT

## 2018-05-28 PROCEDURE — 83880 ASSAY OF NATRIURETIC PEPTIDE: CPT | Performed by: EMERGENCY MEDICINE

## 2018-05-28 PROCEDURE — 85730 THROMBOPLASTIN TIME PARTIAL: CPT | Performed by: EMERGENCY MEDICINE

## 2018-05-28 PROCEDURE — 99284 EMERGENCY DEPT VISIT MOD MDM: CPT

## 2018-05-28 NOTE — ED NOTES
6:54 PM  I have evaluated the patient as the Provider in Triage. I have reviewed Her vital signs and the triage nurse assessment. I have talked with the patient and any available family and advised that I am the provider in triage and have ordered the appropriate study to initiate their work up based on the clinical presentation during my assessment. I have advised that the patient will be accommodated in the Main ED as soon as possible. I have also requested to contact the triage nurse or myself immediately if the patient experiences any changes in their condition during this brief waiting period. Pt had episode of lightheadedness, nausea with 1 episode of emsis, SOB. About 1 hour ago, started while vacuuming. Minimal chest pressure. Had normal stress test \"a long time ago. \"    LIZY Leahy

## 2018-05-28 NOTE — ED PROVIDER NOTES
HPI Comments: 79 y.o. female with past medical history significant for anxiety, MVP, GERD, basal cell carcinoma, HTN, hyperlipidemia who presents via private vehicle with chief complaint of lightheadedness. Pt reports that she was vacuuming earlier today when she became lightheaded. Pt tried eating and became nauseous and vomiting twice. Pt also reports some chest tightness and SOB, but thinks that might be anxiety related. Pt has had episodes of chest tightness in the past month, but they were always at rest and never associated with exertion. Pt reports that she walked on the treadmill this AM for 30 minutes and did not experience any of these sx. Pt takes medication for HTN but was non compliant over the weekend because she left them at home while she was away. Pt took 5 mg of concerta at 1258 this AM.       There are no other acute medical concerns at this time. Social hx: former tobacco smoker (quit ), +EtOH consumption      PCP: Oswaldo Woods MD    Note written by Horacio Valladares, as dictated by Betito Torres MD 7:05 PM      The history is provided by the patient. No  was used. Past Medical History:   Diagnosis Date    ADD (attention deficit disorder) 5/10/2017    Sees np Taryn Gonzales for meds    Anxiety     Basal cell carcinoma, eyelid     right, Dr. Pari Owen, Dr. Landon Herb Environmental allergies     GERD (gastroesophageal reflux disease)     HTN (hypertension) 2011    Hyperlipidemia     Insomnia     Menopause 2009    MVP (mitral valve prolapse) 2009    rare palpitations.  mild-mod regurg 2011    Normal stress echocardiogram 10/2009    Recurrent cold sores        Past Surgical History:   Procedure Laterality Date    BREAST SURGERY PROCEDURE UNLISTED      breast reduction  Dr Lizet Lainez    HX BREAST REDUCTION Bilateral     HX GYN               Family History:   Problem Relation Age of Onset    Cancer Father      lung  Hypertension Brother        Social History     Social History    Marital status:      Spouse name: N/A    Number of children: N/A    Years of education: N/A     Occupational History    Not on file. Social History Main Topics    Smoking status: Former Smoker     Quit date: 1/1/1990    Smokeless tobacco: Never Used      Comment: quit 1990    Alcohol use 1.8 - 2.4 oz/week     3 - 4 Glasses of wine per week    Drug use: No    Sexual activity: Yes     Partners: Male     Other Topics Concern    Not on file     Social History Narrative         ALLERGIES: Codeine    Review of Systems   Constitutional: Positive for fatigue. Negative for activity change and appetite change. HENT: Negative for ear pain, facial swelling, sore throat and trouble swallowing. Eyes: Negative for pain, discharge and visual disturbance. Respiratory: Positive for chest tightness and shortness of breath. Negative for wheezing. Cardiovascular: Negative for chest pain and palpitations. Gastrointestinal: Positive for nausea and vomiting. Negative for abdominal pain and blood in stool. Genitourinary: Negative for difficulty urinating, flank pain and hematuria. Musculoskeletal: Negative for arthralgias, joint swelling, myalgias and neck pain. Skin: Negative for color change and rash. Neurological: Positive for light-headedness. Negative for dizziness, syncope, weakness, numbness and headaches. Hematological: Negative for adenopathy. Does not bruise/bleed easily. Psychiatric/Behavioral: Negative for behavioral problems, confusion and sleep disturbance. The patient is nervous/anxious. All other systems reviewed and are negative. Vitals:    05/28/18 1858   BP: (!) 170/91   Pulse: 74   Resp: 16   Temp: 97.6 °F (36.4 °C)   SpO2: 99%   Weight: 60.6 kg (133 lb 9.6 oz)   Height: 5' 2\" (1.575 m)            Physical Exam   Constitutional: She is oriented to person, place, and time.  She appears well-developed and well-nourished. No distress. HENT:   Head: Normocephalic and atraumatic. Eyes: Conjunctivae are normal. No scleral icterus. Neck: Neck supple. No tracheal deviation present. Cardiovascular: Normal rate, regular rhythm, normal heart sounds and intact distal pulses. Exam reveals no gallop and no friction rub. No murmur heard. Pulmonary/Chest: Effort normal and breath sounds normal. She has no wheezes. She has no rales. Abdominal: Soft. She exhibits no distension. There is no tenderness. There is no rebound and no guarding. Musculoskeletal: She exhibits no edema. Neurological: She is alert and oriented to person, place, and time. Skin: Skin is warm and dry. No rash noted. Psychiatric: She has a normal mood and affect. Nursing note and vitals reviewed. Note written by Horacio Farah, as dictated by Drea Guthrie MD 7:42 PM        MDM  Number of Diagnoses or Management Options  Atypical chest pain:   Nausea without vomiting:      Amount and/or Complexity of Data Reviewed  Clinical lab tests: ordered and reviewed  Tests in the radiology section of CPT®: ordered and reviewed  Tests in the medicine section of CPT®: ordered and reviewed  Discussion of test results with the performing providers: yes  Obtain history from someone other than the patient: yes  Discuss the patient with other providers: yes          ED Course       Procedures    ED EKG interpretation #1:  Rhythm: normal sinus rhythm; and regular . Rate (approx.): 71; Axis: normal; ST/T wave: normal.   Note written by Horacio Farah, as dictated by Drea Guthrie MD 7:17 PM    CONSULT NOTE:  9:00 PM Drea Guthrie MD spoke with Dr. Leslee Guerrero, Consult for Cardiology. Discussed available diagnostic tests and clinical findings. Dr. Leslee Guerrero will call the pt tomorrow and make an appointment for a stress echo in the coming 2 days. ED EKG interpretation #2:  Rhythm: sinus bradycardia; and regular .  Rate (approx.): 59; Axis: normal; ST/T wave: normal.  Note written by Horacio Montez, as dictated by Juana Cantrell MD 9:01 PM    PROGRESS NOTE:  9:08 PM  Assessment and plan  Juana Cantrell MD does not beleive pt's sx are angina but needs stress echo to confirm. 2 sets of enzymes and EKGs are normal.  Spoke with Dr. Breanna Soto, will arrange stress echo in next two days.

## 2018-05-28 NOTE — ED NOTES
Called the lab to ask if all new lab orders could be placed as Add-On. Lab states they were able to add on all labs.

## 2018-05-28 NOTE — ED NOTES
Assumed care of patient at this time. She reports that she has \"chest tightness,\" but does not report any \"chest pain. \" She states she is still concerned about her elevated blood pressure. Patient's cuff was changed to a small. Otherwise she is doing well. CM x 3. Call bell within reach of patient.

## 2018-05-28 NOTE — ED TRIAGE NOTES
TRIAGE NOTE: \"I was just sierra lightheaded and threw up and my BP was up. I felt a little breathless, not sure if it was anxiety. \" Episode a few hours ago after vacuuming. +chest pressure

## 2018-05-29 LAB
ATRIAL RATE: 59 BPM
ATRIAL RATE: 71 BPM
CALCULATED P AXIS, ECG09: 33 DEGREES
CALCULATED P AXIS, ECG09: 37 DEGREES
CALCULATED R AXIS, ECG10: 11 DEGREES
CALCULATED R AXIS, ECG10: 24 DEGREES
CALCULATED T AXIS, ECG11: 55 DEGREES
CALCULATED T AXIS, ECG11: 63 DEGREES
DIAGNOSIS, 93000: NORMAL
DIAGNOSIS, 93000: NORMAL
P-R INTERVAL, ECG05: 154 MS
P-R INTERVAL, ECG05: 162 MS
Q-T INTERVAL, ECG07: 400 MS
Q-T INTERVAL, ECG07: 436 MS
QRS DURATION, ECG06: 94 MS
QRS DURATION, ECG06: 98 MS
QTC CALCULATION (BEZET), ECG08: 431 MS
QTC CALCULATION (BEZET), ECG08: 434 MS
VENTRICULAR RATE, ECG03: 59 BPM
VENTRICULAR RATE, ECG03: 71 BPM

## 2018-05-29 NOTE — ED NOTES
Patient has been medically cleared for discharge at this time. All discharge papers were provided to patient by MD. He reviewed all discharge instructions with patient. She was seen leaving the department with a steady gait and all her belongings.

## 2018-05-29 NOTE — ED NOTES
Reassessed patient at this time. She has no complaints at this time. Redrew a troponin level per MD and repeated the EKG. No changes. CM x 3. Call bell within reach of patient.

## 2018-05-29 NOTE — DISCHARGE INSTRUCTIONS
Chest Pain: Care Instructions  Your Care Instructions    There are many things that can cause chest pain. Some are not serious and will get better on their own in a few days. But some kinds of chest pain need more testing and treatment. Your doctor may have recommended a follow-up visit in the next 8 to 12 hours. If you are not getting better, you may need more tests or treatment. Even though your doctor has released you, you still need to watch for any problems. The doctor carefully checked you, but sometimes problems can develop later. If you have new symptoms or if your symptoms do not get better, get medical care right away. If you have worse or different chest pain or pressure that lasts more than 5 minutes or you passed out (lost consciousness), call 911 or seek other emergency help right away. A medical visit is only one step in your treatment. Even if you feel better, you still need to do what your doctor recommends, such as going to all suggested follow-up appointments and taking medicines exactly as directed. This will help you recover and help prevent future problems. How can you care for yourself at home? · Rest until you feel better. · Take your medicine exactly as prescribed. Call your doctor if you think you are having a problem with your medicine. · Do not drive after taking a prescription pain medicine. When should you call for help? Call 911 if:  ? · You passed out (lost consciousness). ? · You have severe difficulty breathing. ? · You have symptoms of a heart attack. These may include:  ¨ Chest pain or pressure, or a strange feeling in your chest.  ¨ Sweating. ¨ Shortness of breath. ¨ Nausea or vomiting. ¨ Pain, pressure, or a strange feeling in your back, neck, jaw, or upper belly or in one or both shoulders or arms. ¨ Lightheadedness or sudden weakness. ¨ A fast or irregular heartbeat.   After you call 911, the  may tell you to chew 1 adult-strength or 2 to 4 low-dose aspirin. Wait for an ambulance. Do not try to drive yourself. ?Call your doctor today if:  ? · You have any trouble breathing. ? · Your chest pain gets worse. ? · You are dizzy or lightheaded, or you feel like you may faint. ? · You are not getting better as expected. ? · You are having new or different chest pain. Where can you learn more? Go to http://peri-akhil.info/. Enter A120 in the search box to learn more about \"Chest Pain: Care Instructions. \"  Current as of: March 20, 2017  Content Version: 11.4  © 1360-9915 [employer]. Care instructions adapted under license by [employer] (which disclaims liability or warranty for this information). If you have questions about a medical condition or this instruction, always ask your healthcare professional. [address] any warranty or liability for your use of this information. We hope that we have addressed all of your medical concerns. The examination and treatment you received in the Emergency Department were for an emergent problem and were not intended as complete care. It is important that you follow up with your healthcare provider(s) for ongoing care. If your symptoms worsen or do not improve as expected, and you are unable to reach your usual health care provider(s), you should return to the Emergency Department. Today's healthcare is undergoing tremendous change, and patient satisfaction surveys are one of the many tools to assess the quality of medical care. You may receive a survey from the [employer] organization regarding your experience in the Emergency Department. I hope that your experience has been completely positive, particularly the medical care that I provided. As such, please participate in the survey; anything less than excellent does not meet my expectations or intentions.         Thank you for allowing us to provide you with medical care today. We realize that you have many choices for your emergency care needs. Please choose us in the future for any continued health care needs. [facility], [address] [place], [place] [address]y [address].   Office: [phone]            Recent Results (from the past 24 hour(s))   EKG, 12 LEAD, INITIAL    Collection Time: 05/28/18  7:03 PM   Result Value Ref Range    Ventricular Rate 71 BPM    Atrial Rate 71 BPM    P-R Interval 154 ms    QRS Duration 94 ms    Q-T Interval 400 ms    QTC Calculation (Bezet) 434 ms    Calculated P Axis 37 degrees    Calculated R Axis 24 degrees    Calculated T Axis 63 degrees    Diagnosis       Normal sinus rhythm  When compared with ECG of 29-SEP-2009 22:58,  No significant change was found     CBC WITH AUTOMATED DIFF    Collection Time: 05/28/18  7:09 PM   Result Value Ref Range    WBC 10.0 3.6 - 11.0 K/uL    RBC 4.42 3.80 - 5.20 M/uL    HGB 13.9 11.5 - 16.0 g/dL    HCT 39.8 35.0 - 47.0 %    MCV 90.0 80.0 - 99.0 FL    MCH 31.4 26.0 - 34.0 PG    MCHC 34.9 30.0 - 36.5 g/dL    RDW 12.3 11.5 - 14.5 %    PLATELET 745 879 - 959 K/uL    MPV 10.3 8.9 - 12.9 FL    NRBC 0.0 0  WBC    ABSOLUTE NRBC 0.00 0.00 - 0.01 K/uL    NEUTROPHILS 67 32 - 75 %    LYMPHOCYTES 24 12 - 49 %    MONOCYTES 7 5 - 13 %    EOSINOPHILS 0 0 - 7 %    BASOPHILS 0 0 - 1 %    IMMATURE GRANULOCYTES 0 0.0 - 0.5 %    ABS. NEUTROPHILS 6.8 1.8 - 8.0 K/UL    ABS. LYMPHOCYTES 2.4 0.8 - 3.5 K/UL    ABS. MONOCYTES 0.7 0.0 - 1.0 K/UL    ABS. EOSINOPHILS 0.0 0.0 - 0.4 K/UL    ABS. BASOPHILS 0.0 0.0 - 0.1 K/UL    ABS. IMM.  GRANS. 0.0 0.00 - 0.04 K/UL    DF AUTOMATED     METABOLIC PANEL, COMPREHENSIVE    Collection Time: 05/28/18  7:09 PM   Result Value Ref Range    Sodium 140 136 - 145 mmol/L    Potassium 3.8 3.5 - 5.1 mmol/L    Chloride 108 97 - 108 mmol/L    CO2 23 21 - 32 mmol/L    Anion gap 9 5 - 15 mmol/L    Glucose 135 (H) 65 - 100 mg/dL    BUN 15 6 - 20 MG/DL    Creatinine 0.99 0.55 - 1.02 MG/DL    BUN/Creatinine ratio 15 12 - 20      GFR est AA >60 >60 ml/min/1.73m2    GFR est non-AA 56 (L) >60 ml/min/1.73m2    Calcium 9.4 8.5 - 10.1 MG/DL    Bilirubin, total 0.4 0.2 - 1.0 MG/DL    ALT (SGPT) 27 12 - 78 U/L    AST (SGOT) 24 15 - 37 U/L    Alk. phosphatase 73 45 - 117 U/L    Protein, total 7.6 6.4 - 8.2 g/dL    Albumin 4.3 3.5 - 5.0 g/dL    Globulin 3.3 2.0 - 4.0 g/dL    A-G Ratio 1.3 1.1 - 2.2     TROPONIN I    Collection Time: 05/28/18  7:09 PM   Result Value Ref Range    Troponin-I, Qt. <0.04 <0.05 ng/mL   CK W/ REFLX CKMB    Collection Time: 05/28/18  7:09 PM   Result Value Ref Range     26 - 192 U/L   NT-PRO BNP    Collection Time: 05/28/18  7:09 PM   Result Value Ref Range    NT pro- (H) 0 - 125 PG/ML   PROTHROMBIN TIME + INR    Collection Time: 05/28/18  7:09 PM   Result Value Ref Range    INR 1.0 0.9 - 1.1      Prothrombin time 10.3 9.0 - 11.1 sec   PTT    Collection Time: 05/28/18  7:09 PM   Result Value Ref Range    aPTT 26.3 22.1 - 32.0 sec    aPTT, therapeutic range     58.0 - 77.0 SECS   POC TROPONIN-I    Collection Time: 05/28/18  8:55 PM   Result Value Ref Range    Troponin-I (POC) <0.04 0.00 - 0.08 ng/mL   EKG, 12 LEAD, INITIAL    Collection Time: 05/28/18  8:57 PM   Result Value Ref Range    Ventricular Rate 59 BPM    Atrial Rate 59 BPM    P-R Interval 162 ms    QRS Duration 98 ms    Q-T Interval 436 ms    QTC Calculation (Bezet) 431 ms    Calculated P Axis 33 degrees    Calculated R Axis 11 degrees    Calculated T Axis 55 degrees    Diagnosis       Sinus bradycardia  Septal infarct , age undetermined  When compared with ECG of 28-MAY-2018 19:03,  MANUAL COMPARISON REQUIRED, DATA IS UNCONFIRMED         Xr Chest Port    Result Date: 5/28/2018  EXAM:  CR chest portable INDICATION:  Weakness. COMPARISON: 4/20/2018. TECHNIQUE: Portable AP upright chest view at 2000 hours FINDINGS: Cardiac monitoring wires overlie the thorax. The cardiomediastinal contours are stable.  The lungs and pleural spaces are clear. There is no pneumothorax. There is nonspecific right hemidiaphragm elevation. The bones are stable. IMPRESSION: Clear lungs. Nonspecific right hemidiaphragm elevation.

## 2018-06-07 ENCOUNTER — OFFICE VISIT (OUTPATIENT)
Dept: CARDIOLOGY CLINIC | Age: 68
End: 2018-06-07

## 2018-06-07 ENCOUNTER — CLINICAL SUPPORT (OUTPATIENT)
Dept: CARDIOLOGY CLINIC | Age: 68
End: 2018-06-07

## 2018-06-07 VITALS
RESPIRATION RATE: 18 BRPM | OXYGEN SATURATION: 98 % | SYSTOLIC BLOOD PRESSURE: 106 MMHG | BODY MASS INDEX: 24.4 KG/M2 | HEIGHT: 62 IN | DIASTOLIC BLOOD PRESSURE: 64 MMHG | WEIGHT: 132.6 LBS | HEART RATE: 62 BPM

## 2018-06-07 DIAGNOSIS — R07.9 CHEST PAIN, UNSPECIFIED TYPE: Primary | ICD-10-CM

## 2018-06-07 DIAGNOSIS — I10 ESSENTIAL HYPERTENSION: ICD-10-CM

## 2018-06-07 DIAGNOSIS — E78.2 MIXED HYPERLIPIDEMIA: ICD-10-CM

## 2018-06-07 PROBLEM — R07.89 OTHER CHEST PAIN: Status: ACTIVE | Noted: 2018-06-07

## 2018-06-07 PROBLEM — I34.0 NON-RHEUMATIC MITRAL REGURGITATION: Status: ACTIVE | Noted: 2018-06-07

## 2018-06-07 NOTE — PROGRESS NOTES
HISTORY OF PRESENT ILLNESS  Jeet Siddiqi is a 79 y.o. female     SUMMARY:   Problem List  Date Reviewed: 6/7/2018          Codes Class Noted    Other chest pain ICD-10-CM: R07.89  ICD-9-CM: 786.59  6/7/2018    Overview Addendum 6/7/2018  8:23 AM by Anabel Lim MD     10/09 normal cardiolyte stress test, lvef 57%(VCS)  5/11 echo mild to mod mr, otherwise normal echo             Non-rheumatic mitral regurgitation ICD-10-CM: I34.0  ICD-9-CM: 424.0  6/7/2018        ADD (attention deficit disorder) ICD-10-CM: F98.8  ICD-9-CM: 314.00  5/10/2017    Overview Signed 5/10/2017  3:46 PM by Sara Kang MD     Sees rene Roche for meds             Basal cell carcinoma of eyelid, including canthus ICD-10-CM: C44.111  ICD-9-CM: 173.11  8/24/2012        Hyperlipidemia ICD-10-CM: E78.5  ICD-9-CM: 272.4  Unknown        Insomnia ICD-10-CM: G47.00  ICD-9-CM: 780.52  Unknown        Recurrent cold sores ICD-10-CM: B00.1  ICD-9-CM: 054.9  Unknown        Environmental allergies ICD-10-CM: Z91.09  ICD-9-CM: V15.09  Unknown        HTN (hypertension) ICD-10-CM: I10  ICD-9-CM: 401.9  4/13/2011        Normal stress echocardiogram ICD-9-CM: Mickeal Mustache  10/1/2009        GERD (gastroesophageal reflux disease) ICD-10-CM: K21.9  ICD-9-CM: 530.81  6/5/2009        Anxiety ICD-10-CM: F41.9  ICD-9-CM: 300.00  6/5/2009        Menopause ICD-10-CM: Z78.0  ICD-9-CM: 627.2  6/5/2009              Current Outpatient Prescriptions on File Prior to Visit   Medication Sig    lisinopril (PRINIVIL, ZESTRIL) 40 mg tablet Take 1 Tab by mouth daily.  FLUoxetine (PROZAC) 20 mg capsule Take 1 Cap by mouth daily.  methylphenidate ER 18 mg 24 hr tab Take 10 mg by mouth daily.  omeprazole (PRILOSEC) 20 mg capsule Take 1 Cap by mouth daily.     topiramate (TOPAMAX) 50 mg tablet take 1 tablet by mouth once daily    traZODone (DESYREL) 50 mg tablet TAKE 1 TABLET BY MOUTH DAILY    zolpidem (AMBIEN) 10 mg tablet Take 1 Tab by mouth nightly as needed.  acyclovir (ZOVIRAX) 5 % ointment Apply  to affected area every three (3) hours.  atorvastatin (LIPITOR) 20 mg tablet take 1 tablet by mouth once daily    metFORMIN ER (GLUCOPHAGE XR) 500 mg tablet take 1 tablet by mouth daily with DINNER    atenolol (TENORMIN) 50 mg tablet take 1 tablet by mouth once daily    ALPRAZolam (XANAX) 1 mg tablet Take 1 mg by mouth as needed.  hydroCHLOROthiazide (HYDRODIURIL) 25 mg tablet Take 1 Tab by mouth daily.  valsartan (DIOVAN) 320 mg tablet Take 1 Tab by mouth daily.  diclofenac potassium (CATAFLAM) 50 mg tablet Take 1 Tab by mouth two (2) times daily as needed.  dextroamphetamine-amphetamine (ADDERALL) 10 mg tablet Take 10 mg by mouth.  VAGIFEM 10 mcg tab vaginal tablet as needed. No current facility-administered medications on file prior to visit. CARDIOLOGY STUDIES TO DATE:  10/09 normal cardiolyte stress test, lvef 57%(VCS)  5/11 echo mild to mod mr, otherwise normal echo    Chief Complaint   Patient presents with    Chest Pain (Angina)     HPI :  Ms. Angelica Medina is a 79year-old referred from the ER for cardiac evaluation. About ten days ago, she was vacuuming and she developed some chest tightness and pressure, nausea and actually threw up. She felt very lightheaded, ended up in the ER where she had a negative workup. She was scheduled for a stress echocardiogram today and came in. She was not instructed to stop her Atenolol, so we were unable to get an adequate test result. She normally exercises by walking and doing the treadmill without any symptoms suggestive of angina or heart failure. She has hypertension, prediabetes, hyperlipidemia, is a past smoker and her father developed angina in his 46s. She has had prior stress testing, as summarized above.           CARDIAC ROS:   negative for dyspnea, palpitations, syncope, orthopnea, paroxysmal nocturnal dyspnea, exertional chest pressure/discomfort, claudication, lower extremity edema    Family History   Problem Relation Age of Onset    Cancer Father      lung    Hypertension Brother        Past Medical History:   Diagnosis Date    ADD (attention deficit disorder) 5/10/2017    Sees np Taryn Gonzales for meds    Anxiety     Basal cell carcinoma, eyelid     right, Dr. Pari Owen, Dr. Landon Herb Environmental allergies     GERD (gastroesophageal reflux disease)     HTN (hypertension) 4/13/2011    Hyperlipidemia     Insomnia     Menopause 6/5/2009    MVP (mitral valve prolapse) 6/5/2009    rare palpitations. mild-mod regurg 5/2011    Normal stress echocardiogram 10/2009    Recurrent cold sores        GENERAL ROS:  A comprehensive review of systems was negative except for: Genitourinary: positive for frequency  Musculoskeletal: positive for arthralgias and stiff joints    Visit Vitals    /64    Pulse 62    Resp 18    Ht 5' 2\" (1.575 m)    Wt 132 lb 9.6 oz (60.1 kg)    SpO2 98%    BMI 24.25 kg/m2       Wt Readings from Last 3 Encounters:   06/07/18 132 lb 9.6 oz (60.1 kg)   05/28/18 133 lb 9.6 oz (60.6 kg)   05/14/18 134 lb (60.8 kg)            BP Readings from Last 3 Encounters:   06/07/18 106/64   05/28/18 158/90   05/14/18 179/64       PHYSICAL EXAM  General appearance: alert, cooperative, no distress, appears stated age  Neurologic: Alert and oriented X 3  Neck: supple, symmetrical, trachea midline, no adenopathy, no carotid bruit and no JVD  Lungs: clear to auscultation bilaterally  Heart: regular rate and rhythm, S1, S2 normal, no murmur, click, rub or gallop  Abdomen: soft, non-tender.  Bowel sounds normal. No masses,  no organomegaly  Extremities: extremities normal, atraumatic, no cyanosis or edema  Pulses: 2+ and symmetric    Lab Results   Component Value Date/Time    Cholesterol, total 158 01/08/2018 11:43 AM    Cholesterol, total 146 06/27/2017 02:16 PM    Cholesterol, total 169 09/08/2016 02:20 PM    Cholesterol, total 190 01/11/2016 11:49 AM    Cholesterol, total 182 08/28/2015 12:23 PM    HDL Cholesterol 43 01/08/2018 11:43 AM    HDL Cholesterol 53 06/27/2017 02:16 PM    HDL Cholesterol 62 09/08/2016 02:20 PM    HDL Cholesterol 61 01/11/2016 11:49 AM    HDL Cholesterol 54 08/28/2015 12:23 PM    LDL, calculated 66 01/08/2018 11:43 AM    LDL, calculated 45 06/27/2017 02:16 PM    LDL, calculated 76 09/08/2016 02:20 PM    LDL, calculated 88 01/11/2016 11:49 AM    LDL, calculated 69 08/28/2015 12:23 PM    Triglyceride 246 (H) 01/08/2018 11:43 AM    Triglyceride 242 (H) 06/27/2017 02:16 PM    Triglyceride 157 (H) 09/08/2016 02:20 PM    Triglyceride 204 (H) 01/11/2016 11:49 AM    Triglyceride 294 (H) 08/28/2015 12:23 PM    CHOL/HDL Ratio 4.1 06/11/2009 08:15 AM     ASSESSMENT  I think it is unlikely that Ms. Trujillo's episode ten days ago or so represented a cardiac event, however, given all of her risk factors and her wish to continue to exercise, she does need a stress echocardiogram.  She was given written instructions about holding her Atenolol and we will get this scheduled for the near future. current treatment plan is effective, no change in therapy  lab results and schedule of future lab studies reviewed with patient  reviewed diet, exercise and weight control    Encounter Diagnoses   Name Primary?  Chest pain, unspecified type Yes    Mixed hyperlipidemia     Essential hypertension      No orders of the defined types were placed in this encounter. Follow-up Disposition:  Return in about 4 weeks (around 7/5/2018).     Hansel Borges MD  6/7/2018

## 2018-06-07 NOTE — MR AVS SNAPSHOT
[address] [facility] [address] [place] 
[phone] Patient: Chandni Fonseca MRN:  [id number] Visit Information Date & Time Provider Department Dept. Phone Encounter #  
 6/7/2018  1:40 PM Zhao Santillan [facility] [phone] [id number] Follow-up Instructions Return in about 4 weeks (around 7/5/2018). Your Appointments 6/25/2018 11:00 AM  
ECHO CARDIOGRAMS 2D with [facility] ([facility] SCHEDULING) Appt Note: Stress Echo per Dr. Faviola Mar DX: CP,HTN,Pre Diabetes, High Chol  
 [address] [place]  
[address] [facility]  
  
    
 6/25/2018 11:00 AM  
STRESS ECHOCARDIOGRAMS with STRESSECHO, AD[facility] ([facility] SCHEDULING) Appt Note: Stress Echo per Dr. Faviola Mar DX: CP,HTN,Pre Diabetes, High Chol  
 [address] [place]  
[address] [facility] [address] [id number] Upcoming Health Maintenance Date Due ZOSTER VACCINE AGE 60> 5/18/2010 GLAUCOMA SCREENING Q2Y 6/25/2017 Influenza Age 5 to Adult 8/1/2018 Pneumococcal 65+ Low/Medium Risk (2 of 2 - PCV13) 4/17/2019 MEDICARE YEARLY EXAM 4/18/2019 BREAST CANCER SCRN MAMMOGRAM 4/24/2020 COLONOSCOPY 2/18/2024 DTaP/Tdap/Td series (2 - Td) 12/11/2027 Allergies as of 6/7/2018  Review Complete On: 6/7/2018 By: Zhao Santillan MD  
  
 Severity Noted Reaction Type Reactions Codeine  04/21/2009    Other (comments) Patient says she is not allergic Current Immunizations  Reviewed on 4/17/2018 Name Date Pneumococcal Polysaccharide (PPSV-23) 4/17/2018 Rabies Vaccine 6/4/2012  2:38 PM, 5/28/2012 11:51 AM, 5/24/2012  4:24 PM, 5/21/2012  5:41 PM  
 TD Vaccine 7/25/2007 Not reviewed this visit You Were Diagnosed With   
  
 Codes Comments Chest pain, unspecified type    -  Primary ICD-10-CM: R07.9 ICD-9-CM: 786.50 Mixed hyperlipidemia     ICD-10-CM: E78.2 ICD-9-CM: 272.2 Essential hypertension     ICD-10-CM: I10 
ICD-9-CM: 401.9 Vitals BP Pulse Resp Height(growth percentile) Weight(growth percentile) SpO2  
 106/64 62 18 5' 2\" (1.575 m) 132 lb 9.6 oz (60.1 kg) 98% BMI OB Status Smoking Status 24.25 kg/m2 Postmenopausal Former Smoker Vitals History BMI and BSA Data Body Mass Index Body Surface Area  
 24.25 kg/m 2 1.62 m 2 Preferred Pharmacy Pharmacy Name Phone [facility] [phone] Your Updated Medication List  
  
   
This list is accurate as of 6/7/18  3:41 PM.  Always use your most recent med list.  
  
  
  
  
 acyclovir 5 % ointment Commonly known as:  ZOVIRAX Apply  to affected area every three (3) hours. ALPRAZolam 1 mg tablet Commonly known as:  Trula Crick Take 1 mg by mouth as needed. atenolol 50 mg tablet Commonly known as:  TENORMIN  
take 1 tablet by mouth once daily  
  
 atorvastatin 20 mg tablet Commonly known as:  LIPITOR  
take 1 tablet by mouth once daily FLUoxetine 20 mg capsule Commonly known as:  PROzac Take 1 Cap by mouth daily. lisinopril 40 mg tablet Commonly known as:  Leilani Husbands Take 1 Tab by mouth daily. metFORMIN  mg tablet Commonly known as:  GLUCOPHAGE XR  
take 1 tablet by mouth daily with DINNER  
  
 methylphenidate HCl 18 mg CR tablet Commonly known as:  CONCERTA Take 10 mg by mouth daily. omeprazole 20 mg capsule Commonly known as:  PRILOSEC Take 1 Cap by mouth daily. topiramate 50 mg tablet Commonly known as:  TOPAMAX  
take 1 tablet by mouth once daily  
  
 zolpidem 10 mg tablet Commonly known as:  AMBIEN Take 1 Tab by mouth nightly as needed. Follow-up Instructions Return in about 4 weeks (around 7/5/2018). To-Do List   
 06/07/2018 ECHO:  ECHO TTE STRESS COMP W OR WO CONTR Introducing [facility]! Dear Edmund Tirado: Thank you for requesting a Pure360 account. Our records indicate that you already have an active Pure360 account. You can access your account anytime at [email or web address] Did you know that you can access your hospital and ER discharge instructions at any time in Pure360? You can also review all of your test results from your hospital stay or ER visit. Additional Information If you have questions, please visit the Frequently Asked Questions section of the Pure360 website at [email or web address] Remember, Pure360 is NOT to be used for urgent needs. For medical emergencies, dial 911. Now available from your iPhone and Android! Please provide this summary of care documentation to your next provider. Your primary care clinician is listed as Hallie Sanchez. If you have any questions after today's visit, please call [phone].

## 2018-06-07 NOTE — PROGRESS NOTES
THR not attained. Dr. Marianna Bey notified and test cancelled. Will be repeated off beta blocker per Dr. Marianna Bey.

## 2018-06-25 ENCOUNTER — CLINICAL SUPPORT (OUTPATIENT)
Dept: CARDIOLOGY CLINIC | Age: 68
End: 2018-06-25

## 2018-06-25 DIAGNOSIS — I10 ESSENTIAL HYPERTENSION: ICD-10-CM

## 2018-06-25 DIAGNOSIS — R07.9 CHEST PAIN, UNSPECIFIED TYPE: ICD-10-CM

## 2018-06-25 DIAGNOSIS — E78.2 MIXED HYPERLIPIDEMIA: ICD-10-CM

## 2018-07-04 DIAGNOSIS — G47.00 INSOMNIA, UNSPECIFIED TYPE: ICD-10-CM

## 2018-07-05 RX ORDER — TRAZODONE HYDROCHLORIDE 50 MG/1
TABLET ORAL
Qty: 30 TAB | Refills: 0 | Status: SHIPPED | OUTPATIENT
Start: 2018-07-05

## 2018-07-19 RX ORDER — ATENOLOL 50 MG/1
TABLET ORAL
Qty: 90 TAB | Refills: 1 | Status: SHIPPED | OUTPATIENT
Start: 2018-07-19

## 2018-07-24 ENCOUNTER — PATIENT MESSAGE (OUTPATIENT)
Dept: INTERNAL MEDICINE CLINIC | Age: 68
End: 2018-07-24

## 2018-07-24 NOTE — TELEPHONE ENCOUNTER
From: Celina Richards Hip[facility]armaximus  To: Judith Riojas MD  Sent: 7/24/2018 1:05 PM EDT  Subject: Update Medical Information    Hi Dr. [place] Stearns,    I just wanted to thank you for all of your years of treatment, but I am in the process of switching practices. The drive with the traffic and road work near me is now taking me at least 35 or 40 minutes which definitely raises my blood pressure-haha. Greg Guerra was my Mother's Dr and is my brother's Dr and his practice is located much closer to where I live. I signed a form yesterday asking for a transfer of records so just wanted to let you know what was going on and again, thank you!   Celina Richards

## 2018-08-23 ENCOUNTER — HOSPITAL ENCOUNTER (OUTPATIENT)
Dept: LAB | Age: 68
Discharge: HOME | End: 2018-08-23

## 2018-09-12 ENCOUNTER — HOSPITAL ENCOUNTER (EMERGENCY)
Age: 68
Discharge: HOME | End: 2018-09-12
Attending: EMERGENCY MEDICINE | Admitting: EMERGENCY MEDICINE
Payer: MEDICARE

## 2018-09-12 ENCOUNTER — APPOINTMENT (OUTPATIENT)
Dept: CT IMAGING | Age: 68
End: 2018-09-12
Attending: PHYSICIAN ASSISTANT
Payer: MEDICARE

## 2018-09-12 ENCOUNTER — APPOINTMENT (OUTPATIENT)
Dept: GENERAL RADIOLOGY | Age: 68
End: 2018-09-12
Attending: PHYSICIAN ASSISTANT
Payer: MEDICARE

## 2018-09-12 VITALS
WEIGHT: 130 LBS | HEART RATE: 68 BPM | HEIGHT: 62 IN | TEMPERATURE: 98.1 F | RESPIRATION RATE: 16 BRPM | BODY MASS INDEX: 23.92 KG/M2 | DIASTOLIC BLOOD PRESSURE: 84 MMHG | OXYGEN SATURATION: 98 % | SYSTOLIC BLOOD PRESSURE: 178 MMHG
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
## 2018-09-12 DIAGNOSIS — R07.81 RIB PAIN ON LEFT SIDE: ICD-10-CM

## 2018-09-12 DIAGNOSIS — S09.90XA INJURY OF HEAD, INITIAL ENCOUNTER: ICD-10-CM

## 2018-09-12 DIAGNOSIS — S66.912A HAND STRAIN, LEFT, INITIAL ENCOUNTER: ICD-10-CM

## 2018-09-12 PROCEDURE — 71101 X-RAY EXAM UNILAT RIBS/CHEST: CPT

## 2018-09-12 PROCEDURE — 73110 X-RAY EXAM OF WRIST: CPT

## 2018-09-12 PROCEDURE — 74011250637 HC RX REV CODE- 250/637: Performed by: PHYSICIAN ASSISTANT

## 2018-09-12 PROCEDURE — 72125 CT NECK SPINE W/O DYE: CPT

## 2018-09-12 PROCEDURE — 70450 CT HEAD/BRAIN W/O DYE: CPT

## 2018-09-12 PROCEDURE — 99283 EMERGENCY DEPT VISIT LOW MDM: CPT

## 2018-09-12 RX ORDER — TRAMADOL HYDROCHLORIDE 50 MG/1
50 TABLET ORAL
Qty: 15 TAB | Refills: 0 | Status: SHIPPED | OUTPATIENT
Start: 2018-09-12

## 2018-09-12 RX ORDER — NAPROXEN 500 MG/1
500 TABLET ORAL
Qty: 20 TAB | Refills: 0 | Status: SHIPPED | OUTPATIENT
Start: 2018-09-12

## 2018-09-12 RX ORDER — DIAZEPAM 5 MG/1
5 TABLET ORAL
Status: COMPLETED | OUTPATIENT
Start: 2018-09-12 | End: 2018-09-12

## 2018-09-12 RX ORDER — DIAZEPAM 5 MG/1
5 TABLET ORAL
Qty: 15 TAB | Refills: 0 | Status: SHIPPED | OUTPATIENT
Start: 2018-09-12

## 2018-09-12 RX ORDER — KETOROLAC TROMETHAMINE 30 MG/ML
30 INJECTION, SOLUTION INTRAMUSCULAR; INTRAVENOUS
Status: DISCONTINUED | OUTPATIENT
Start: 2018-09-12 | End: 2018-09-13 | Stop reason: HOSPADM

## 2018-09-12 RX ADMIN — DIAZEPAM 5 MG: 5 TABLET ORAL at 20:56

## 2018-09-12 NOTE — ED TRIAGE NOTES
C/o running into restaurant during rain last night and fell onto left side. Denies LOC. +hit head. +left flank pain. +generalized soreness. Small abrasions to left hand and forearm.   Denies N/V.

## 2018-09-12 NOTE — ED NOTES
7:03 PM 
I have evaluated the patient as the Provider in Triage. I have reviewed Her vital signs and the triage nurse assessment. I have talked with the patient and any available family and advised that I am the provider in triage and have ordered the appropriate study to initiate their work up based on the clinical presentation during my assessment. I have advised that the patient will be accommodated in the Main ED as soon as possible. I have also requested to contact the triage nurse or myself immediately if the patient experiences any changes in their condition during this brief waiting period. Running in the rain last night going into a restaurant, slipped and fell, struck left ribs, head, left wrist.  Mild blurred vision.    
 
LIZY Ho

## 2018-09-13 NOTE — ED PROVIDER NOTES
HPI Comments: 76 y.o. female with past medical history significant for Anxiety, Mitral Prolapse, GERD, Basal Cell Carcinoma, Hypertension, Hyperlipidemia, and CAD who presents from home with chief complaint of evaluation after GLF. Patient states last night while running into a restaurant in the rain she tripped on the curb and fell onto her left side. Patient reports head trauma and notes \"seeing stars\" immediately after fall, but denies LOC. Patient reports left head pain, neck pain described as \"mild\" with limited ROM secondary to pain, left rib pain, and left wrist pain with accompanying small abrasions on the dorsal aspect. Patient reports aggravation of left rib pain with pleuritic and positional movement, as well as aggravation of neck pain with palpation. Patient reports accompanying generalized pain with ambulation. Patient reports taking Tylenol and Aspirin today for aforementioned pain and residual generalized muscle soreness with no relief. Pt denies fever, chills, cough, congestion, shortness of breath, chest pain, abdominal pain, nausea, vomiting, diarrhea, difficulty with urination or dysuria. There are no other acute medical concerns at this time. PCP: Migdalia Martin MD    Note written by Horacio Doss, as dictated by Stan nEciso PA-C 8:29 PM      The history is provided by the patient. Past Medical History:   Diagnosis Date    ADD (attention deficit disorder) 5/10/2017    Sees np Elena Canchola for meds    Anxiety     Basal cell carcinoma, eyelid     right, Dr. Ayaan Caraballo, Dr. Austin Duque CAD (coronary artery disease)     Environmental allergies     GERD (gastroesophageal reflux disease)     HTN (hypertension) 4/13/2011    Hyperlipidemia     Insomnia     Menopause 6/5/2009    MVP (mitral valve prolapse) 6/5/2009    rare palpitations.  mild-mod regurg 5/2011    Normal stress echocardiogram 10/2009    Recurrent cold sores        Past Surgical History:   Procedure Laterality Date    BREAST SURGERY PROCEDURE UNLISTED      breast reduction  Dr Ronald Morrissey    HX BREAST REDUCTION Bilateral 2011    HX GYN               Family History:   Problem Relation Age of Onset    Cancer Father      lung    Heart Disease Father      angina starting in his 52's    Hypertension Brother        Social History     Social History    Marital status:      Spouse name: N/A    Number of children: N/A    Years of education: N/A     Occupational History    Not on file. Social History Main Topics    Smoking status: Former Smoker     Quit date: 1990    Smokeless tobacco: Never Used      Comment: quit     Alcohol use 1.8 - 2.4 oz/week     3 - 4 Glasses of wine per week    Drug use: No    Sexual activity: Yes     Partners: Male     Other Topics Concern    Not on file     Social History Narrative         ALLERGIES: Codeine    Review of Systems   Constitutional: Negative. Negative for chills and fever. HENT: Negative for ear discharge. Eyes: Negative for photophobia, pain, discharge and visual disturbance. Respiratory: Negative for apnea, cough, chest tightness and shortness of breath. Cardiovascular: Negative for chest pain, palpitations and leg swelling. Gastrointestinal: Negative for abdominal distention, abdominal pain, blood in stool, nausea and vomiting. Genitourinary: Negative for difficulty urinating, dysuria, flank pain, frequency and hematuria. Musculoskeletal: Positive for neck pain. Negative for back pain, gait problem, joint swelling and myalgias. Head pain, Rib pain, Wrist pain   Skin: Positive for wound. Negative for color change and pallor. Neurological: Negative for dizziness, syncope, weakness, numbness and headaches. Psychiatric/Behavioral: Negative for behavioral problems and confusion. The patient is not nervous/anxious. All other systems reviewed and are negative.       Vitals:    18 1910   BP: (!) 190/95   Pulse: 71 Resp: 16   Temp: 97.5 °F (36.4 °C)   SpO2: 98%   Weight: 59 kg (130 lb)   Height: 5' 2\" (1.575 m)            Physical Exam   Constitutional: She is oriented to person, place, and time. She appears well-developed and well-nourished. She appears distressed (mild). HENT:   Head: Normocephalic and atraumatic. Right Ear: External ear normal.   Left Ear: External ear normal.   Nose: Nose normal.   Mouth/Throat: Oropharynx is clear and moist.   Eyes: Conjunctivae and EOM are normal. Pupils are equal, round, and reactive to light. Right eye exhibits no discharge. Left eye exhibits no discharge. Neck: Normal range of motion. Neck supple. Cardiovascular: Normal rate, regular rhythm, normal heart sounds and intact distal pulses. Pulmonary/Chest: Effort normal and breath sounds normal. She exhibits tenderness. Abdominal: Soft. Bowel sounds are normal. She exhibits no distension. There is no tenderness. There is no rebound and no guarding. Musculoskeletal: She exhibits no edema. Cervical back: She exhibits tenderness (Paravertebral). Left hand: She exhibits tenderness. She exhibits normal range of motion. Left anterior and lateral rib tenderness   Neurological: She is alert and oriented to person, place, and time. No cranial nerve deficit. Coordination normal.   Skin: Skin is warm and dry. No rash noted. Superficial abrasion on left hand   Psychiatric: She has a normal mood and affect. Her behavior is normal. Judgment and thought content normal.   Nursing note and vitals reviewed.   Note written by Horacio Patel, as dictated by Mar Jang PA-C 8:29 PM      MDM  Number of Diagnoses or Management Options  Fall, initial encounter:   Hand strain, left, initial encounter:   Injury of head, initial encounter:   Rib pain on left side:      Amount and/or Complexity of Data Reviewed  Tests in the radiology section of CPT®: ordered and reviewed  Discuss the patient with other providers: yes  Independent visualization of images, tracings, or specimens: yes          ED Course       Procedures  CT HEAD WO CONT: Impression: No acute intracranial process    CT SPINE CERV WO CONT: Impression: No acute abnormality    XR RIBS LT W PA CXR MIN 3 V: Impression: No acute fracture or dislocation is identified    XR WRIST LT AP/LAT/OBL MIN 3 V: Impression: No acute abnormality. PROGRESS NOTE:  9:00 PM Provider reviewing patient's labs and results with Dr. Turcios Ahr. Dr. Turcios Ahr agrees with care plan. PROGRESS NOTE:  9:18 PM Patient is feeling better and vital signs stable, given signs and symptoms to return to ER. Patient's results have been reviewed with them. Patient and/or family have verbally conveyed their understanding and agreement of the patient's signs, symptoms, diagnosis, treatment and prognosis and additionally agree to follow up as recommended or return to the Emergency Room should their condition change prior to follow-up. Discharge instructions have also been provided to the patient with some educational information regarding their diagnosis as well a list of reasons why they would want to return to the ER prior to their follow-up appointment should their condition change.   LIZY Goode

## 2018-09-13 NOTE — DISCHARGE INSTRUCTIONS
Learning About a Closed Head Injury  What is a closed head injury? A closed head injury happens when your head gets hit hard. The strong force of the blow causes your brain to shake in your skull. This movement can cause the brain to bruise, swell, or tear. Sometimes nerves or blood vessels also get damaged. This can cause bleeding in or around the brain. A concussion is a type of closed head injury. What are the symptoms? If you have a mild concussion, you may have a mild headache or feel \"not quite right. \" These symptoms are common. They usually go away over a few days to 4 weeks. But sometimes after a concussion, you feel like you can't function as well as before the injury. And you have new symptoms. This is called postconcussive syndrome. You may:  · Find it harder to solve problems, think, concentrate, or remember. · Have headaches. · Have changes in your sleep patterns, such as not being able to sleep or sleeping all the time. · Have changes in your personality. · Not be interested in your usual activities. · Feel angry or anxious without a clear reason. · Lose your sense of taste or smell. · Be dizzy, lightheaded, or unsteady. It may be hard to stand or walk. How is a closed head injury treated? Any person who may have a concussion needs to see a doctor. Some people have to stay in the hospital to be watched. Others can go home safely. If you go home, follow your doctor's instructions. He or she will tell you if you need someone to watch you closely for the next 24 hours or longer. Rest is the best treatment. Get plenty of sleep at night. And try to rest during the day. · Avoid activities that are physically or mentally demanding. These include housework, exercise, and schoolwork. And don't play video games, send text messages, or use the computer. You may need to change your school or work schedule to be able to avoid these activities.   · Ask your doctor when it's okay to drive, ride a bike, or operate machinery. · Take an over-the-counter pain medicine, such as acetaminophen (Tylenol), ibuprofen (Advil, Motrin), or naproxen (Aleve). Be safe with medicines. Read and follow all instructions on the label. · Check with your doctor before you use any other medicines for pain. · Do not drink alcohol or use illegal drugs. They can slow recovery. They can also increase your risk of getting a second head injury. Follow-up care is a key part of your treatment and safety. Be sure to make and go to all appointments, and call your doctor if you are having problems. It's also a good idea to know your test results and keep a list of the medicines you take. Where can you learn more? Go to http://peri-akhil.info/. Enter E235 in the search box to learn more about \"Learning About a Closed Head Injury. \"  Current as of: October 9, 2017  Content Version: 11.7  © 7702-8696 [employer]. Care instructions adapted under license by [employer] (which disclaims liability or warranty for this information). If you have questions about a medical condition or this instruction, always ask your healthcare professional. [address] any warranty or liability for your use of this information. Muscle Strain: Care Instructions  Your Care Instructions    A muscle strain happens when you overstretch, or pull, a muscle. It can happen when you exercise or lift something or when you have an accident. Rest and other home care can help the muscle heal.  Follow-up care is a key part of your treatment and safety. Be sure to make and go to all appointments, and call your doctor if you are having problems. It's also a good idea to know your test results and keep a list of the medicines you take. How can you care for yourself at home? · Rest the strained muscle. Do not put weight on it for a day or two.  If your doctor advises you to, use crutches or a sling to rest a sore limb. · Put ice or a cold pack on the sore muscle for 10 to 20 minutes at a time to stop swelling. Put a thin cloth between the ice pack and your skin. · Prop up the sore arm or leg on a pillow when you ice it or anytime you sit or lie down during the next 3 days. Try to keep it above the level of your heart. This will help reduce swelling. · Take pain medicines exactly as directed. ¨ If the doctor gave you a prescription medicine for pain, take it as prescribed. ¨ If you are not taking a prescription pain medicine, ask your doctor if you can take an over-the-counter medicine. · Do not do anything that makes the pain worse. Return to exercise gradually as you feel better. When should you call for help? Call your doctor now or seek immediate medical care if:    · You have new severe pain.     · Your injured limb is cool or pale or changes color.     · You have tingling, weakness, or numbness in your injured limb.     · You cannot move the injured area.    Watch closely for changes in your health, and be sure to contact your doctor if:    · You cannot put weight on a joint, or it feels unsteady when you walk.     · Pain and swelling get worse or do not start to get better after 2 days of home treatment. Where can you learn more? Go to http://peri-akhil.info/. Enter C531 in the search box to learn more about \"Muscle Strain: Care Instructions. \"  Current as of: November 29, 2017  Content Version: 11.7  © 8286-7326 [employer]. Care instructions adapted under license by [employer] (which disclaims liability or warranty for this information). If you have questions about a medical condition or this instruction, always ask your healthcare professional. [place] [address] any warranty or liability for your use of this information.

## 2018-09-27 DIAGNOSIS — E78.5 DYSLIPIDEMIA, GOAL LDL BELOW 100: ICD-10-CM

## 2018-09-27 RX ORDER — ATORVASTATIN CALCIUM 20 MG/1
TABLET, FILM COATED ORAL
Qty: 90 TAB | Refills: 1 | OUTPATIENT
Start: 2018-09-27

## 2018-10-02 RX ORDER — LISINOPRIL 40 MG/1
TABLET ORAL
Qty: 30 TAB | Refills: 0 | OUTPATIENT
Start: 2018-10-02

## 2018-11-07 ENCOUNTER — HOSPITAL ENCOUNTER (OUTPATIENT)
Dept: LAB | Age: 68
Discharge: HOME | End: 2018-11-07

## 2019-10-29 ENCOUNTER — HOSPITAL ENCOUNTER (OUTPATIENT)
Dept: MAMMOGRAPHY | Age: 69
Discharge: HOME | End: 2019-10-29
Attending: SPECIALIST
Payer: MEDICARE

## 2019-10-29 DIAGNOSIS — Z12.31 VISIT FOR SCREENING MAMMOGRAM: ICD-10-CM

## 2019-10-29 PROCEDURE — 77067 SCR MAMMO BI INCL CAD: CPT

## 2021-01-28 ENCOUNTER — IMMUNIZATION (OUTPATIENT)
Dept: FAMILY MEDICINE CLINIC | Age: 71
End: 2021-01-28
Payer: MEDICARE

## 2021-01-28 DIAGNOSIS — Z23 ENCOUNTER FOR IMMUNIZATION: Primary | ICD-10-CM

## 2021-01-28 PROCEDURE — 91301 COVID-19, MRNA, LNP-S, PF, 100MCG/0.5ML DOSE(MODERNA): CPT | Performed by: FAMILY MEDICINE

## 2021-01-29 ENCOUNTER — TRANSCRIBE ORDER (OUTPATIENT)
Dept: SCHEDULING | Age: 71
End: 2021-01-29

## 2021-01-29 DIAGNOSIS — Z12.31 VISIT FOR SCREENING MAMMOGRAM: Primary | ICD-10-CM

## 2021-02-10 ENCOUNTER — HOSPITAL ENCOUNTER (OUTPATIENT)
Dept: MAMMOGRAPHY | Age: 71
Discharge: HOME | End: 2021-02-10
Attending: FAMILY MEDICINE
Payer: MEDICARE

## 2021-02-10 DIAGNOSIS — Z12.31 VISIT FOR SCREENING MAMMOGRAM: ICD-10-CM

## 2021-02-10 PROCEDURE — 77063 BREAST TOMOSYNTHESIS BI: CPT

## 2021-02-26 ENCOUNTER — IMMUNIZATION (OUTPATIENT)
Dept: FAMILY MEDICINE CLINIC | Age: 71
End: 2021-02-26
Payer: MEDICARE

## 2021-02-26 DIAGNOSIS — Z23 ENCOUNTER FOR IMMUNIZATION: Primary | ICD-10-CM

## 2021-02-26 PROCEDURE — 0012A COVID-19, MRNA, LNP-S, PF, 100MCG/0.5ML DOSE(MODERNA): CPT | Performed by: FAMILY MEDICINE

## 2021-02-26 PROCEDURE — 91301 COVID-19, MRNA, LNP-S, PF, 100MCG/0.5ML DOSE(MODERNA): CPT | Performed by: FAMILY MEDICINE

## 2022-03-18 PROBLEM — R07.89 OTHER CHEST PAIN: Status: ACTIVE | Noted: 2018-06-07

## 2022-03-19 PROBLEM — I34.0 NON-RHEUMATIC MITRAL REGURGITATION: Status: ACTIVE | Noted: 2018-06-07

## 2022-03-19 PROBLEM — F98.8 ADD (ATTENTION DEFICIT DISORDER): Status: ACTIVE | Noted: 2017-05-10

## 2022-04-19 ENCOUNTER — TRANSCRIBE ORDER (OUTPATIENT)
Dept: SCHEDULING | Age: 72
End: 2022-04-19

## 2022-04-19 DIAGNOSIS — Z12.31 SCREENING MAMMOGRAM FOR HIGH-RISK PATIENT: Primary | ICD-10-CM

## 2022-04-20 ENCOUNTER — TRANSCRIBE ORDER (OUTPATIENT)
Dept: SCHEDULING | Age: 72
End: 2022-04-20

## 2022-04-20 DIAGNOSIS — M15.0 PRIMARY GENERALIZED (OSTEO)ARTHRITIS: Primary | ICD-10-CM

## 2022-04-20 DIAGNOSIS — M15.0 PRIMARY GENERALIZED HYPERTROPHIC OSTEOARTHROSIS: Primary | ICD-10-CM

## 2022-04-21 ENCOUNTER — TRANSCRIBE ORDER (OUTPATIENT)
Dept: SCHEDULING | Age: 72
End: 2022-04-21

## 2022-04-21 DIAGNOSIS — M15.0 PRIMARY GENERALIZED (OSTEO)ARTHRITIS: Primary | ICD-10-CM

## 2022-04-21 DIAGNOSIS — Z13.820 OSTEOPOROSIS SCREENING: Primary | ICD-10-CM

## 2022-05-04 ENCOUNTER — HOSPITAL ENCOUNTER (OUTPATIENT)
Dept: MAMMOGRAPHY | Age: 72
Discharge: HOME | End: 2022-05-04
Attending: FAMILY MEDICINE
Payer: MEDICARE

## 2022-05-04 DIAGNOSIS — Z12.31 SCREENING MAMMOGRAM FOR HIGH-RISK PATIENT: ICD-10-CM

## 2022-05-04 PROCEDURE — 77063 BREAST TOMOSYNTHESIS BI: CPT

## 2023-03-01 ENCOUNTER — TRANSCRIBE ORDER (OUTPATIENT)
Dept: SCHEDULING | Age: 73
End: 2023-03-01

## 2023-03-01 DIAGNOSIS — Z12.31 VISIT FOR SCREENING MAMMOGRAM: Primary | ICD-10-CM

## 2023-04-22 DIAGNOSIS — Z12.31 VISIT FOR SCREENING MAMMOGRAM: Primary | ICD-10-CM

## 2023-09-08 ENCOUNTER — HOSPITAL ENCOUNTER (OUTPATIENT)
Facility: HOSPITAL | Age: 73
End: 2023-09-08
Payer: MEDICARE

## 2023-09-08 VITALS — HEIGHT: 61 IN | BODY MASS INDEX: 24.35 KG/M2 | WEIGHT: 129 LBS

## 2023-09-08 DIAGNOSIS — Z12.31 VISIT FOR SCREENING MAMMOGRAM: ICD-10-CM

## 2023-09-08 PROCEDURE — 77063 BREAST TOMOSYNTHESIS BI: CPT

## 2024-09-12 ENCOUNTER — OFFICE VISIT (OUTPATIENT)
Age: 74
End: 2024-09-12
Payer: MEDICARE

## 2024-09-12 VITALS
HEART RATE: 55 BPM | DIASTOLIC BLOOD PRESSURE: 78 MMHG | BODY MASS INDEX: 23.62 KG/M2 | WEIGHT: 125 LBS | OXYGEN SATURATION: 97 % | SYSTOLIC BLOOD PRESSURE: 124 MMHG

## 2024-09-12 DIAGNOSIS — Z76.89 ESTABLISHING CARE WITH NEW DOCTOR, ENCOUNTER FOR: Primary | ICD-10-CM

## 2024-09-12 DIAGNOSIS — R53.83 OTHER FATIGUE: ICD-10-CM

## 2024-09-12 DIAGNOSIS — I10 PRIMARY HYPERTENSION: ICD-10-CM

## 2024-09-12 LAB — TSH SERPL DL<=0.05 MIU/L-ACNC: 1.15 UIU/ML (ref 0.36–3.74)

## 2024-09-12 PROCEDURE — 3078F DIAST BP <80 MM HG: CPT | Performed by: INTERNAL MEDICINE

## 2024-09-12 PROCEDURE — 3017F COLORECTAL CA SCREEN DOC REV: CPT | Performed by: INTERNAL MEDICINE

## 2024-09-12 PROCEDURE — 93010 ELECTROCARDIOGRAM REPORT: CPT | Performed by: INTERNAL MEDICINE

## 2024-09-12 PROCEDURE — 1123F ACP DISCUSS/DSCN MKR DOCD: CPT | Performed by: INTERNAL MEDICINE

## 2024-09-12 PROCEDURE — 1090F PRES/ABSN URINE INCON ASSESS: CPT | Performed by: INTERNAL MEDICINE

## 2024-09-12 PROCEDURE — 93005 ELECTROCARDIOGRAM TRACING: CPT | Performed by: INTERNAL MEDICINE

## 2024-09-12 PROCEDURE — G8427 DOCREV CUR MEDS BY ELIG CLIN: HCPCS | Performed by: INTERNAL MEDICINE

## 2024-09-12 PROCEDURE — 1036F TOBACCO NON-USER: CPT | Performed by: INTERNAL MEDICINE

## 2024-09-12 PROCEDURE — 99204 OFFICE O/P NEW MOD 45 MIN: CPT | Performed by: INTERNAL MEDICINE

## 2024-09-12 PROCEDURE — G8399 PT W/DXA RESULTS DOCUMENT: HCPCS | Performed by: INTERNAL MEDICINE

## 2024-09-12 PROCEDURE — 3074F SYST BP LT 130 MM HG: CPT | Performed by: INTERNAL MEDICINE

## 2024-09-12 PROCEDURE — G8420 CALC BMI NORM PARAMETERS: HCPCS | Performed by: INTERNAL MEDICINE

## 2024-09-12 RX ORDER — FAMOTIDINE 40 MG/1
40 TABLET, FILM COATED ORAL DAILY
COMMUNITY

## 2024-09-12 RX ORDER — AMLODIPINE BESYLATE 5 MG/1
5 TABLET ORAL DAILY
COMMUNITY
End: 2024-09-12 | Stop reason: SDUPTHER

## 2024-09-12 RX ORDER — AMLODIPINE BESYLATE 5 MG/1
10 TABLET ORAL DAILY
Qty: 180 TABLET | Refills: 0 | Status: SHIPPED | OUTPATIENT
Start: 2024-09-12 | End: 2024-12-11

## 2024-11-08 ENCOUNTER — ANCILLARY PROCEDURE (OUTPATIENT)
Age: 74
End: 2024-11-08
Payer: MEDICARE

## 2024-11-08 VITALS — BODY MASS INDEX: 23.6 KG/M2 | WEIGHT: 125 LBS | HEIGHT: 61 IN

## 2024-11-08 DIAGNOSIS — I10 PRIMARY HYPERTENSION: ICD-10-CM

## 2024-11-08 LAB
ECHO AO ASC DIAM: 2.8 CM
ECHO AO ASCENDING AORTA INDEX: 1.81 CM/M2
ECHO AO ROOT DIAM: 3.4 CM
ECHO AO ROOT INDEX: 2.19 CM/M2
ECHO AR MAX VEL PISA: 3.9 M/S
ECHO AV AREA PEAK VELOCITY: 1.7 CM2
ECHO AV AREA VTI: 1.7 CM2
ECHO AV AREA/BSA PEAK VELOCITY: 1.1 CM2/M2
ECHO AV AREA/BSA VTI: 1.1 CM2/M2
ECHO AV MEAN GRADIENT: 5 MMHG
ECHO AV MEAN VELOCITY: 1 M/S
ECHO AV PEAK GRADIENT: 8 MMHG
ECHO AV PEAK VELOCITY: 1.5 M/S
ECHO AV REGURGITANT PHT: 701.5 MILLISECOND
ECHO AV VELOCITY RATIO: 0.6
ECHO AV VTI: 30.7 CM
ECHO BSA: 1.56 M2
ECHO EST RA PRESSURE: 3 MMHG
ECHO LA DIAMETER INDEX: 1.94 CM/M2
ECHO LA DIAMETER: 3 CM
ECHO LA TO AORTIC ROOT RATIO: 0.88
ECHO LA VOL A-L A2C: 90 ML (ref 22–52)
ECHO LA VOL A-L A4C: 38 ML (ref 22–52)
ECHO LA VOL BP: 55 ML (ref 22–52)
ECHO LA VOL MOD A2C: 82 ML (ref 22–52)
ECHO LA VOL MOD A4C: 36 ML (ref 22–52)
ECHO LA VOL/BSA BIPLANE: 35 ML/M2 (ref 16–34)
ECHO LA VOLUME AREA LENGTH: 59 ML
ECHO LA VOLUME INDEX A-L A2C: 58 ML/M2 (ref 16–34)
ECHO LA VOLUME INDEX A-L A4C: 25 ML/M2 (ref 16–34)
ECHO LA VOLUME INDEX AREA LENGTH: 38 ML/M2 (ref 16–34)
ECHO LA VOLUME INDEX MOD A2C: 53 ML/M2 (ref 16–34)
ECHO LA VOLUME INDEX MOD A4C: 23 ML/M2 (ref 16–34)
ECHO LV E' LATERAL VELOCITY: 10 CM/S
ECHO LV E' SEPTAL VELOCITY: 6 CM/S
ECHO LV EDV A2C: 49 ML
ECHO LV EDV A4C: 57 ML
ECHO LV EDV BP: 54 ML (ref 56–104)
ECHO LV EDV INDEX A4C: 37 ML/M2
ECHO LV EDV INDEX BP: 35 ML/M2
ECHO LV EDV NDEX A2C: 32 ML/M2
ECHO LV EJECTION FRACTION A2C: 62 %
ECHO LV EJECTION FRACTION A4C: 56 %
ECHO LV EJECTION FRACTION BIPLANE: 58 % (ref 55–100)
ECHO LV ESV A2C: 18 ML
ECHO LV ESV A4C: 25 ML
ECHO LV ESV BP: 23 ML (ref 19–49)
ECHO LV ESV INDEX A2C: 12 ML/M2
ECHO LV ESV INDEX A4C: 16 ML/M2
ECHO LV ESV INDEX BP: 15 ML/M2
ECHO LV FRACTIONAL SHORTENING: 24 % (ref 28–44)
ECHO LV INTERNAL DIMENSION DIASTOLE INDEX: 2.65 CM/M2
ECHO LV INTERNAL DIMENSION DIASTOLIC: 4.1 CM (ref 3.9–5.3)
ECHO LV INTERNAL DIMENSION SYSTOLIC INDEX: 2 CM/M2
ECHO LV INTERNAL DIMENSION SYSTOLIC: 3.1 CM
ECHO LV IVSD: 0.8 CM (ref 0.6–0.9)
ECHO LV MASS 2D: 123 G (ref 67–162)
ECHO LV MASS INDEX 2D: 79.3 G/M2 (ref 43–95)
ECHO LV POSTERIOR WALL DIASTOLIC: 1.1 CM (ref 0.6–0.9)
ECHO LV RELATIVE WALL THICKNESS RATIO: 0.54
ECHO LVOT AREA: 2.8 CM2
ECHO LVOT AV VTI INDEX: 0.6
ECHO LVOT DIAM: 1.9 CM
ECHO LVOT MEAN GRADIENT: 2 MMHG
ECHO LVOT PEAK GRADIENT: 3 MMHG
ECHO LVOT PEAK VELOCITY: 0.9 M/S
ECHO LVOT STROKE VOLUME INDEX: 33.6 ML/M2
ECHO LVOT SV: 52.1 ML
ECHO LVOT VTI: 18.4 CM
ECHO MV A VELOCITY: 0.82 M/S
ECHO MV E DECELERATION TIME (DT): 269.6 MS
ECHO MV E VELOCITY: 0.78 M/S
ECHO MV E/A RATIO: 0.95
ECHO MV E/E' LATERAL: 7.8
ECHO MV E/E' RATIO (AVERAGED): 10.4
ECHO MV E/E' SEPTAL: 13
ECHO PV MAX VELOCITY: 1 M/S
ECHO PV PEAK GRADIENT: 4 MMHG
ECHO RIGHT VENTRICULAR SYSTOLIC PRESSURE (RVSP): 28 MMHG
ECHO TV REGURGITANT MAX VELOCITY: 2.48 M/S
ECHO TV REGURGITANT PEAK GRADIENT: 25 MMHG

## 2024-11-08 PROCEDURE — 93306 TTE W/DOPPLER COMPLETE: CPT | Performed by: INTERNAL MEDICINE

## 2024-11-08 NOTE — RESULT ENCOUNTER NOTE
Echo shows normal heart function ejection fraction of 55 to 60% mild to moderate mitral regurgitation and mild to moderate aortic regurgitation with mild thickening of aortic valve nothing to worry continue same treatment

## 2024-11-11 ENCOUNTER — TELEPHONE (OUTPATIENT)
Age: 74
End: 2024-11-11

## 2024-11-11 NOTE — TELEPHONE ENCOUNTER
----- Message from Dr. Zana Billings MD sent at 11/8/2024  5:57 PM EST -----  Echo shows normal heart function ejection fraction of 55 to 60% mild to moderate mitral regurgitation and mild to moderate aortic regurgitation with mild thickening of aortic valve nothing to worry continue same treatment

## 2024-11-11 NOTE — TELEPHONE ENCOUNTER
Telephone call made to patient. Two patient identifiers verified. Went over results with patient. Verified understanding. All questions answered.

## 2025-02-10 ENCOUNTER — OFFICE VISIT (OUTPATIENT)
Age: 75
End: 2025-02-10
Payer: MEDICARE

## 2025-02-10 VITALS
BODY MASS INDEX: 24.17 KG/M2 | DIASTOLIC BLOOD PRESSURE: 66 MMHG | HEART RATE: 73 BPM | WEIGHT: 128 LBS | OXYGEN SATURATION: 99 % | HEIGHT: 61 IN | SYSTOLIC BLOOD PRESSURE: 118 MMHG

## 2025-02-10 DIAGNOSIS — R53.83 OTHER FATIGUE: ICD-10-CM

## 2025-02-10 DIAGNOSIS — I10 PRIMARY HYPERTENSION: Primary | ICD-10-CM

## 2025-02-10 DIAGNOSIS — E78.2 MIXED HYPERLIPIDEMIA: ICD-10-CM

## 2025-02-10 DIAGNOSIS — I34.0 NONRHEUMATIC MITRAL VALVE REGURGITATION: ICD-10-CM

## 2025-02-10 PROCEDURE — 1090F PRES/ABSN URINE INCON ASSESS: CPT

## 2025-02-10 PROCEDURE — 1036F TOBACCO NON-USER: CPT

## 2025-02-10 PROCEDURE — 99214 OFFICE O/P EST MOD 30 MIN: CPT

## 2025-02-10 PROCEDURE — G8428 CUR MEDS NOT DOCUMENT: HCPCS

## 2025-02-10 PROCEDURE — 3017F COLORECTAL CA SCREEN DOC REV: CPT

## 2025-02-10 PROCEDURE — G8420 CALC BMI NORM PARAMETERS: HCPCS

## 2025-02-10 PROCEDURE — 1123F ACP DISCUSS/DSCN MKR DOCD: CPT

## 2025-02-10 PROCEDURE — 3074F SYST BP LT 130 MM HG: CPT

## 2025-02-10 PROCEDURE — G8399 PT W/DXA RESULTS DOCUMENT: HCPCS

## 2025-02-10 PROCEDURE — 3078F DIAST BP <80 MM HG: CPT

## 2025-02-10 RX ORDER — PAROXETINE 20 MG/1
0.5 TABLET, FILM COATED ORAL EVERY MORNING
COMMUNITY
Start: 2024-12-27 | End: 2025-03-27

## 2025-02-10 RX ORDER — ALPRAZOLAM 1 MG/1
1 TABLET ORAL AS NEEDED
COMMUNITY

## 2025-02-10 NOTE — PROGRESS NOTES
rhythm, S1, S2 normal,  +murmur, no click, rub or gallop  Abdomen: soft, non tender  Extremities: extremities normal, atraumatic, no cyanosis or edema  Skin: No significant rashes  MSKTL: Overall good ROM ext  Neuro: Grossly intact  Psych: Appropriate affect    LABS / OTHER STUDIES:     No results found for: \"NA\", \"K\", \"CL\", \"CO2\", \"GLU\", \"BUN\", \"GFRAA\", \"TP\", \"GLOB\", \"ALT\", \"AST\"    No results found for: \"CHOL\", \"CHOLPOCT\", \"CHLST\", \"CHOLV\", \"TOTCHOLEXT\", \"HDL\", \"HDLPOC\", \"HDLEXT\", \"HDLC\", \"LDL\", \"LDLCEXT\", \"LDLC\", \"VLDLC\", \"VLDL\", \"TRIGLYCEXT\"    No results found for: \"CHOL\", \"TRIG\", \"HDL\", \"SMALLLDLP\", \"LDLNMR\", \"HDLNMR\", \"TRIGLYNRM\"      CARDIAC DIAGNOSTICS:     Cardiac Evaluation Includes:  I reviewed the test results below.  11/08/24    ECHO (TTE) COMPLETE (PRN CONTRAST/BUBBLE/STRAIN/3D) 11/08/2024  5:53 PM (Final)    Interpretation Summary    Left Ventricle: Normal left ventricular systolic function with a visually estimated EF of 55 - 60%. EF by 2D Simpsons Biplane is 58%. Left ventricle size is normal. Normal wall thickness. Normal wall motion.    Aortic Valve: Trileaflet valve. Sclerosis of the aortic valve cusps. Mild to moderate regurgitation.    Mitral Valve: Mild to moderate regurgitation with a centrally directed jet.    Tricuspid Valve: Mild to moderate regurgitation. Normal RVSP. The estimated RVSP is 28 mmHg.    Left Atrium: Left atrium is mildly dilated. Left atrial volume index is mildly increased (35-41 mL/m2).    Image quality is adequate.    Signed by: Zana Billings MD on 11/8/2024  5:53 PM        No results found for this or any previous visit.      No results found for this or any previous visit.       Future Appointments   Date Time Provider Department Center   6/25/2025  2:00 PM REJI BERNARD ECHO 1 [facility] L[facility]   7/24/2025  2:00 PM Zana Billings MD [facility] Samantha Huff, APRN-NP  [facility]    [address] [place] [address], [address]  [place], [place]

## 2025-02-10 NOTE — ASSESSMENT & PLAN NOTE
-BP looks great today, but she had a recent appointment at PCP and they felt her BP was running too low and she says at times it can get closer to 100 systolic I told her we could try reducing amlodipine back down to 5mg and she will take BP daily and send me an update with values, if BP becomes elevated will need to go back to amlodipine 10mg  -amlodipine was increased last visit in response to stopping atenolol due to low HR and fatigue- HR looks good today  -Currently on lisinopril 20 mg once daily, amlodipine 10 mg once daily    Orders:    [facility] - Janeth Huffman MD, [facility]

## 2025-02-10 NOTE — PATIENT INSTRUCTIONS
-call sleep medicine  -decreased amlodipine to 5mg every day  -Please check your blood pressure daily (at least one hour after your morning blood pressure medications.)  Keep a written record of your blood pressure readings and bring it to each appointment.  If your systolic blood pressure is consistently greater than 150mmHg or less than 100mmHg then please call the office at [phone].

## 2025-02-11 RX ORDER — AMLODIPINE BESYLATE 5 MG/1
5 TABLET ORAL DAILY
Qty: 90 TABLET | Refills: 1 | Status: SHIPPED | OUTPATIENT
Start: 2025-02-11 | End: 2025-08-10

## 2025-02-11 NOTE — TELEPHONE ENCOUNTER
Per VO of MD    LOV: 2/10/2025     Future Appointments   Date Time Provider Department Center   6/25/2025  2:00 PM BSFRIDA BERNARD ECHO 1 [facility] LOW   7/24/2025  2:00 PM Harper Billings MD [facility] AM[facility]       Requested Prescriptions     Signed Prescriptions Disp Refills    amLODIPine (NORVASC) 5 MG tablet 90 tablet 1     Sig: Take 1 tablet by mouth daily     Authorizing Provider: HARPER BILLINGS     Ordering User: LUZ TODD       Dose reduced to 5 mg daily by ROGELIO England on 2/10.

## 2025-06-25 ENCOUNTER — ANCILLARY PROCEDURE (OUTPATIENT)
Age: 75
End: 2025-06-25
Payer: MEDICARE

## 2025-06-25 VITALS
WEIGHT: 128 LBS | BODY MASS INDEX: 24.17 KG/M2 | DIASTOLIC BLOOD PRESSURE: 72 MMHG | HEIGHT: 61 IN | SYSTOLIC BLOOD PRESSURE: 120 MMHG

## 2025-06-25 DIAGNOSIS — I34.0 NONRHEUMATIC MITRAL VALVE REGURGITATION: ICD-10-CM

## 2025-06-25 LAB
ECHO AO ASC DIAM: 3.1 CM
ECHO AO ASCENDING AORTA INDEX: 1.99 CM/M2
ECHO AO ROOT DIAM: 3.2 CM
ECHO AO ROOT INDEX: 2.05 CM/M2
ECHO AR MAX VEL PISA: 3.7 M/S
ECHO AV MEAN GRADIENT: 6 MMHG
ECHO AV MEAN VELOCITY: 1.1 M/S
ECHO AV PEAK GRADIENT: 12 MMHG
ECHO AV PEAK VELOCITY: 1.7 M/S
ECHO AV REGURGITANT PHT: 773.8 MS
ECHO AV VELOCITY RATIO: 0.59
ECHO AV VTI: 34 CM
ECHO BSA: 1.58 M2
ECHO EST RA PRESSURE: 3 MMHG
ECHO LA DIAMETER INDEX: 2.05 CM/M2
ECHO LA DIAMETER: 3.2 CM
ECHO LA TO AORTIC ROOT RATIO: 1
ECHO LA VOL A-L A2C: 43 ML (ref 22–52)
ECHO LA VOL A-L A4C: 32 ML (ref 22–52)
ECHO LA VOL BP: 36 ML (ref 22–52)
ECHO LA VOL MOD A2C: 41 ML (ref 22–52)
ECHO LA VOL MOD A4C: 30 ML (ref 22–52)
ECHO LA VOL/BSA BIPLANE: 23 ML/M2 (ref 16–34)
ECHO LA VOLUME AREA LENGTH: 38 ML
ECHO LA VOLUME INDEX A-L A2C: 28 ML/M2 (ref 16–34)
ECHO LA VOLUME INDEX A-L A4C: 21 ML/M2 (ref 16–34)
ECHO LA VOLUME INDEX AREA LENGTH: 24 ML/M2 (ref 16–34)
ECHO LA VOLUME INDEX MOD A2C: 26 ML/M2 (ref 16–34)
ECHO LA VOLUME INDEX MOD A4C: 19 ML/M2 (ref 16–34)
ECHO LV E' LATERAL VELOCITY: 9.66 CM/S
ECHO LV E' SEPTAL VELOCITY: 7.32 CM/S
ECHO LV EDV A2C: 37 ML
ECHO LV EDV A4C: 72 ML
ECHO LV EDV BP: 53 ML (ref 56–104)
ECHO LV EDV INDEX A4C: 46 ML/M2
ECHO LV EDV INDEX BP: 34 ML/M2
ECHO LV EDV NDEX A2C: 24 ML/M2
ECHO LV EF PHYSICIAN: 60 %
ECHO LV EJECTION FRACTION A2C: 48 %
ECHO LV EJECTION FRACTION A4C: 66 %
ECHO LV EJECTION FRACTION BIPLANE: 59 % (ref 55–100)
ECHO LV ESV A2C: 19 ML
ECHO LV ESV A4C: 24 ML
ECHO LV ESV BP: 21 ML (ref 19–49)
ECHO LV ESV INDEX A2C: 12 ML/M2
ECHO LV ESV INDEX A4C: 15 ML/M2
ECHO LV ESV INDEX BP: 13 ML/M2
ECHO LV FRACTIONAL SHORTENING: 26 % (ref 28–44)
ECHO LV INTERNAL DIMENSION DIASTOLE INDEX: 2.95 CM/M2
ECHO LV INTERNAL DIMENSION DIASTOLIC: 4.6 CM (ref 3.9–5.3)
ECHO LV INTERNAL DIMENSION SYSTOLIC INDEX: 2.18 CM/M2
ECHO LV INTERNAL DIMENSION SYSTOLIC: 3.4 CM
ECHO LV IVSD: 1 CM (ref 0.6–0.9)
ECHO LV MASS 2D: 148.1 G (ref 67–162)
ECHO LV MASS INDEX 2D: 94.9 G/M2 (ref 43–95)
ECHO LV POSTERIOR WALL DIASTOLIC: 0.9 CM (ref 0.6–0.9)
ECHO LV RELATIVE WALL THICKNESS RATIO: 0.39
ECHO LVOT AV VTI INDEX: 0.54
ECHO LVOT MEAN GRADIENT: 2 MMHG
ECHO LVOT PEAK GRADIENT: 4 MMHG
ECHO LVOT PEAK VELOCITY: 1 M/S
ECHO LVOT VTI: 18.3 CM
ECHO MV A VELOCITY: 0.86 M/S
ECHO MV E DECELERATION TIME (DT): 418.1 MS
ECHO MV E VELOCITY: 0.61 M/S
ECHO MV E/A RATIO: 0.71
ECHO MV E/E' LATERAL: 6.31
ECHO MV E/E' RATIO (AVERAGED): 7.32
ECHO MV E/E' SEPTAL: 8.33
ECHO RA AREA 4C: 13.1 CM2
ECHO RA END SYSTOLIC VOLUME APICAL 4 CHAMBER INDEX BSA: 21 ML/M2
ECHO RA VOLUME AREA LENGTH APICAL 4 CHAMBER: 33 ML
ECHO RA VOLUME: 32 ML
ECHO RIGHT VENTRICULAR SYSTOLIC PRESSURE (RVSP): 26 MMHG
ECHO RV FREE WALL PEAK S': 12.6 CM/S
ECHO RV INTERNAL DIMENSION: 2.8 CM
ECHO RV TAPSE: 2.5 CM (ref 1.7–?)
ECHO TV REGURGITANT MAX VELOCITY: 2.38 M/S
ECHO TV REGURGITANT PEAK GRADIENT: 23 MMHG

## 2025-06-25 PROCEDURE — 93306 TTE W/DOPPLER COMPLETE: CPT | Performed by: INTERNAL MEDICINE

## 2025-08-07 ENCOUNTER — OFFICE VISIT (OUTPATIENT)
Age: 75
End: 2025-08-07
Payer: MEDICARE

## 2025-08-07 VITALS
WEIGHT: 117 LBS | HEART RATE: 67 BPM | HEIGHT: 61 IN | OXYGEN SATURATION: 95 % | DIASTOLIC BLOOD PRESSURE: 66 MMHG | BODY MASS INDEX: 22.09 KG/M2 | SYSTOLIC BLOOD PRESSURE: 104 MMHG

## 2025-08-07 DIAGNOSIS — E78.2 MIXED HYPERLIPIDEMIA: ICD-10-CM

## 2025-08-07 DIAGNOSIS — R53.83 OTHER FATIGUE: Primary | ICD-10-CM

## 2025-08-07 PROCEDURE — 3074F SYST BP LT 130 MM HG: CPT

## 2025-08-07 PROCEDURE — G8399 PT W/DXA RESULTS DOCUMENT: HCPCS

## 2025-08-07 PROCEDURE — 1159F MED LIST DOCD IN RCRD: CPT

## 2025-08-07 PROCEDURE — 1126F AMNT PAIN NOTED NONE PRSNT: CPT

## 2025-08-07 PROCEDURE — 1036F TOBACCO NON-USER: CPT

## 2025-08-07 PROCEDURE — 99214 OFFICE O/P EST MOD 30 MIN: CPT

## 2025-08-07 PROCEDURE — 3017F COLORECTAL CA SCREEN DOC REV: CPT

## 2025-08-07 PROCEDURE — G8420 CALC BMI NORM PARAMETERS: HCPCS

## 2025-08-07 PROCEDURE — 3078F DIAST BP <80 MM HG: CPT

## 2025-08-07 PROCEDURE — 1123F ACP DISCUSS/DSCN MKR DOCD: CPT

## 2025-08-07 PROCEDURE — G8427 DOCREV CUR MEDS BY ELIG CLIN: HCPCS

## 2025-08-07 PROCEDURE — 1090F PRES/ABSN URINE INCON ASSESS: CPT

## 2025-08-07 ASSESSMENT — PATIENT HEALTH QUESTIONNAIRE - PHQ9
SUM OF ALL RESPONSES TO PHQ QUESTIONS 1-9: 0
SUM OF ALL RESPONSES TO PHQ QUESTIONS 1-9: 0
1. LITTLE INTEREST OR PLEASURE IN DOING THINGS: NOT AT ALL
SUM OF ALL RESPONSES TO PHQ QUESTIONS 1-9: 0
2. FEELING DOWN, DEPRESSED OR HOPELESS: NOT AT ALL
SUM OF ALL RESPONSES TO PHQ QUESTIONS 1-9: 0

## 2025-09-03 ENCOUNTER — HOSPITAL ENCOUNTER (OUTPATIENT)
Age: 75
Discharge: HOME | End: 2025-09-06
Payer: MEDICARE

## 2025-09-03 VITALS — BODY MASS INDEX: 22.09 KG/M2 | WEIGHT: 117 LBS | HEIGHT: 61 IN

## 2025-09-03 DIAGNOSIS — Z12.31 VISIT FOR SCREENING MAMMOGRAM: ICD-10-CM

## 2025-09-03 DIAGNOSIS — E78.2 MIXED HYPERLIPIDEMIA: ICD-10-CM

## 2025-09-03 PROCEDURE — 77063 BREAST TOMOSYNTHESIS BI: CPT

## 2025-09-03 PROCEDURE — 75571 CT HRT W/O DYE W/CA TEST: CPT

## 2025-09-04 ENCOUNTER — TELEPHONE (OUTPATIENT)
Age: 75
End: 2025-09-04